# Patient Record
Sex: MALE | NOT HISPANIC OR LATINO | Employment: OTHER | URBAN - METROPOLITAN AREA
[De-identification: names, ages, dates, MRNs, and addresses within clinical notes are randomized per-mention and may not be internally consistent; named-entity substitution may affect disease eponyms.]

---

## 2017-08-24 ENCOUNTER — TRANSCRIBE ORDERS (OUTPATIENT)
Dept: ADMINISTRATIVE | Facility: HOSPITAL | Age: 52
End: 2017-08-24

## 2017-08-24 DIAGNOSIS — R11.0 NAUSEA ALONE: ICD-10-CM

## 2017-08-24 DIAGNOSIS — R10.11 RUQ ABDOMINAL PAIN: Primary | ICD-10-CM

## 2017-08-29 ENCOUNTER — HOSPITAL ENCOUNTER (OUTPATIENT)
Dept: ULTRASOUND IMAGING | Facility: HOSPITAL | Age: 52
Discharge: HOME | End: 2017-08-29
Payer: COMMERCIAL

## 2017-08-29 DIAGNOSIS — R10.11 RUQ ABDOMINAL PAIN: ICD-10-CM

## 2017-08-29 PROCEDURE — 76700 US EXAM ABDOM COMPLETE: CPT

## 2017-09-13 ENCOUNTER — ALLSCRIPTS OFFICE VISIT (OUTPATIENT)
Dept: OTHER | Facility: OTHER | Age: 52
End: 2017-09-13

## 2017-09-13 ENCOUNTER — TRANSCRIBE ORDERS (OUTPATIENT)
Dept: ADMINISTRATIVE | Facility: HOSPITAL | Age: 52
End: 2017-09-13

## 2017-09-13 DIAGNOSIS — R41.89 AKINETIC MUTISM: Primary | ICD-10-CM

## 2017-09-13 DIAGNOSIS — R41.89 OTHER SYMPTOMS AND SIGNS INVOLVING COGNITIVE FUNCTIONS AND AWARENESS: ICD-10-CM

## 2017-09-15 ENCOUNTER — LAB CONVERSION - ENCOUNTER (OUTPATIENT)
Dept: OTHER | Facility: OTHER | Age: 52
End: 2017-09-15

## 2017-09-15 LAB
25(OH)D3 SERPL-MCNC: 31 NG/ML (ref 30–100)
TSH SERPL DL<=0.05 MIU/L-ACNC: 0.93 MIU/L (ref 0.4–4.5)

## 2017-09-18 ENCOUNTER — LAB CONVERSION - ENCOUNTER (OUTPATIENT)
Dept: OTHER | Facility: OTHER | Age: 52
End: 2017-09-18

## 2017-09-18 ENCOUNTER — HOSPITAL ENCOUNTER (OUTPATIENT)
Dept: NUCLEAR MEDICINE | Facility: HOSPITAL | Age: 52
Discharge: HOME | End: 2017-09-18
Payer: COMMERCIAL

## 2017-09-18 DIAGNOSIS — R11.0 NAUSEA ALONE: ICD-10-CM

## 2017-09-18 LAB
25(OH)D3 SERPL-MCNC: 31 NG/ML (ref 30–100)
ANTI-NUCLEAR ANTIBODY (ANA) (HISTORICAL): NEGATIVE
TSH SERPL DL<=0.05 MIU/L-ACNC: 0.93 MIU/L (ref 0.4–4.5)

## 2017-09-18 PROCEDURE — A9541 TC99M SULFUR COLLOID: HCPCS

## 2017-09-18 PROCEDURE — 78264 GASTRIC EMPTYING IMG STUDY: CPT

## 2017-09-20 ENCOUNTER — LAB CONVERSION - ENCOUNTER (OUTPATIENT)
Dept: OTHER | Facility: OTHER | Age: 52
End: 2017-09-20

## 2017-09-20 LAB
25(OH)D3 SERPL-MCNC: 31 NG/ML (ref 30–100)
ANTI-NUCLEAR ANTIBODY (ANA) (HISTORICAL): NEGATIVE
CONTACT: (HISTORICAL): NORMAL
TEST INFORMATION (HISTORICAL): NORMAL
TEST NAME (HISTORICAL): NORMAL
TSH SERPL DL<=0.05 MIU/L-ACNC: 0.93 MIU/L (ref 0.4–4.5)

## 2017-09-21 ENCOUNTER — LAB CONVERSION - ENCOUNTER (OUTPATIENT)
Dept: OTHER | Facility: OTHER | Age: 52
End: 2017-09-21

## 2017-09-21 LAB
25(OH)D3 SERPL-MCNC: 31 NG/ML (ref 30–100)
ANTI-NUCLEAR ANTIBODY (ANA) (HISTORICAL): NEGATIVE
BUN SERPL-MCNC: 14 MG/DL (ref 7–25)
BUN/CREA RATIO (HISTORICAL): NORMAL (CALC) (ref 6–22)
CONTACT: (HISTORICAL): NORMAL
CREAT SERPL-MCNC: 0.98 MG/DL (ref 0.7–1.33)
EGFR AFRICAN AMERICAN (HISTORICAL): 102 ML/MIN/1.73M2
EGFR-AMERICAN CALC (HISTORICAL): 88 ML/MIN/1.73M2
FOLATE SERPL-MCNC: 13.1 NG/ML
TEST INFORMATION (HISTORICAL): NORMAL
TEST NAME (HISTORICAL): NORMAL
TSH SERPL DL<=0.05 MIU/L-ACNC: 0.93 MIU/L (ref 0.4–4.5)
VIT B12 SERPL-MCNC: 380 PG/ML (ref 200–1100)

## 2017-09-28 ENCOUNTER — HOSPITAL ENCOUNTER (OUTPATIENT)
Dept: NEUROLOGY | Facility: HOSPITAL | Age: 52
Discharge: HOME | End: 2017-09-28
Payer: COMMERCIAL

## 2017-09-28 ENCOUNTER — HOSPITAL ENCOUNTER (OUTPATIENT)
Dept: RADIOLOGY | Facility: HOSPITAL | Age: 52
Discharge: HOME | End: 2017-09-28
Payer: COMMERCIAL

## 2017-09-28 DIAGNOSIS — R41.89 AKINETIC MUTISM: ICD-10-CM

## 2017-09-28 PROCEDURE — 70553 MRI BRAIN STEM W/O & W/DYE: CPT

## 2017-09-28 PROCEDURE — A9585 GADOBUTROL INJECTION: HCPCS | Performed by: RADIOLOGY

## 2017-09-28 PROCEDURE — 95816 EEG AWAKE AND DROWSY: CPT

## 2017-09-28 RX ADMIN — GADOBUTROL 9 ML: 604.72 INJECTION INTRAVENOUS at 14:19

## 2018-01-13 VITALS
HEART RATE: 60 BPM | BODY MASS INDEX: 29.68 KG/M2 | RESPIRATION RATE: 12 BRPM | SYSTOLIC BLOOD PRESSURE: 130 MMHG | WEIGHT: 212 LBS | OXYGEN SATURATION: 98 % | HEIGHT: 71 IN | DIASTOLIC BLOOD PRESSURE: 80 MMHG

## 2022-09-13 PROBLEM — E11.9 DMII (DIABETES MELLITUS, TYPE 2) (HCC): Status: ACTIVE | Noted: 2021-02-06

## 2022-09-13 PROBLEM — E78.00 HYPERCHOLESTEREMIA: Status: ACTIVE | Noted: 2021-02-06

## 2022-09-13 PROBLEM — F07.81 POST CONCUSSIVE ENCEPHALOPATHY: Status: ACTIVE | Noted: 2017-09-29

## 2022-09-13 PROBLEM — G43.909 MIGRAINE: Status: ACTIVE | Noted: 2017-09-13

## 2022-09-13 PROBLEM — G90.50 RSD (REFLEX SYMPATHETIC DYSTROPHY): Status: ACTIVE | Noted: 2021-02-06

## 2022-09-13 PROBLEM — I10 HYPERTENSION: Status: ACTIVE | Noted: 2021-02-06

## 2023-01-03 ENCOUNTER — OFFICE VISIT (OUTPATIENT)
Dept: URGENT CARE | Facility: CLINIC | Age: 58
End: 2023-01-03

## 2023-01-03 VITALS — HEART RATE: 70 BPM | OXYGEN SATURATION: 96 % | RESPIRATION RATE: 18 BRPM | TEMPERATURE: 102.7 F

## 2023-01-03 DIAGNOSIS — R50.9 FEVER, UNSPECIFIED FEVER CAUSE: Primary | ICD-10-CM

## 2023-01-03 RX ORDER — ACETAMINOPHEN 325 MG/1
650 TABLET ORAL ONCE
Status: COMPLETED | OUTPATIENT
Start: 2023-01-03 | End: 2023-01-03

## 2023-01-03 RX ORDER — ACETAMINOPHEN 325 MG/1
650 TABLET ORAL EVERY 6 HOURS PRN
Status: DISCONTINUED | OUTPATIENT
Start: 2023-01-03 | End: 2023-01-03

## 2023-01-03 RX ORDER — OSELTAMIVIR PHOSPHATE 75 MG/1
75 CAPSULE ORAL EVERY 12 HOURS SCHEDULED
Qty: 10 CAPSULE | Refills: 0 | Status: SHIPPED | OUTPATIENT
Start: 2023-01-03 | End: 2023-01-08

## 2023-01-03 RX ADMIN — ACETAMINOPHEN 650 MG: 325 TABLET ORAL at 19:32

## 2023-01-04 LAB
FLUAV RNA RESP QL NAA+PROBE: NEGATIVE
FLUBV RNA RESP QL NAA+PROBE: NEGATIVE
SARS-COV-2 RNA RESP QL NAA+PROBE: POSITIVE

## 2023-01-04 NOTE — PATIENT INSTRUCTIONS
Take medication as directed  Rest and drink plenty of fluids  A cool mist humidifier can be helpful  If you develop a prolonged high fever, worsening cough, chest pain, shortness of breath, palpitations, severe headache, dizziness, you  pass out, any new or concerning symptoms please return or proceed ER    Recommend follow-up with PCP in 3-5 days

## 2023-01-05 NOTE — PROGRESS NOTES
[facility]        NAME: Arelis Bassett is a 62 y o  male  : 1965    MRN: [id number]  DATE: 2023  TIME: 3:02 PM    Assessment and Plan   Fever, unspecified fever cause [R50 9]  1  Fever, unspecified fever cause  Covid/Flu-Office Collect    acetaminophen (TYLENOL) tablet 650 mg    oseltamivir (TAMIFLU) 75 mg capsule    DISCONTINUED: acetaminophen (TYLENOL) tablet 650 mg            Patient Instructions     Patient Instructions    Take medication as directed  Rest and drink plenty of fluids  A cool mist humidifier can be helpful  If you develop a prolonged high fever, worsening cough, chest pain, shortness of breath, palpitations, severe headache, dizziness, you  pass out, any new or concerning symptoms please return or proceed ER  Recommend follow-up with PCP in 3-5 days        Chief Complaint     Chief Complaint   Patient presents with   • Generalized Body Aches     Fever, aches, fatigue x 2 days            History of Present Illness       Fever  This is a new problem  The current episode started yesterday  The problem occurs constantly  The problem has been unchanged  Associated symptoms include chills, congestion, fatigue, a fever and myalgias  Pertinent negatives include no abdominal pain, arthralgias, chest pain, coughing, diaphoresis, headaches, joint swelling, nausea, neck pain, numbness, rash, sore throat, swollen glands, urinary symptoms, vomiting or weakness  Nothing aggravates the symptoms  He has tried acetaminophen for the symptoms  The treatment provided mild relief  Review of Systems   Review of Systems   Constitutional: Positive for chills, fatigue and fever  Negative for diaphoresis  HENT: Positive for congestion and rhinorrhea  Negative for ear discharge, ear pain, facial swelling, mouth sores, postnasal drip, sinus pressure, sinus pain, sore throat and trouble swallowing  Eyes: Negative for photophobia and visual disturbance     Respiratory: Negative for cough, chest tightness, shortness of breath, wheezing and stridor  Cardiovascular: Negative for chest pain and palpitations  Gastrointestinal: Negative for abdominal pain, diarrhea, nausea and vomiting  Genitourinary: Negative  Musculoskeletal: Positive for myalgias  Negative for arthralgias, back pain, joint swelling, neck pain and neck stiffness  Skin: Negative for rash  Neurological: Negative for dizziness, facial asymmetry, weakness, light-headedness, numbness and headaches           Current Medications       Current Outpatient Medications:   •  oseltamivir (TAMIFLU) 75 mg capsule, Take 1 capsule (75 mg total) by mouth every 12 (twelve) hours for 5 days, Disp: 10 capsule, Rfl: 0  •  ASHWAGANDHA PO, Take by mouth, Disp: , Rfl:   •  BIOTIN PO, Take by mouth, Disp: , Rfl:   •  linaCLOtide (Linzess) 290 MCG CAPS, Take 1 capsule by mouth in the morning 30 min before breakfast, Disp: 30 capsule, Rfl: 5  •  lisinopril (ZESTRIL) 10 mg tablet, Take 10 mg by mouth daily, Disp: , Rfl:   •  MILK THISTLE PO, Take by mouth, Disp: , Rfl:   •  MULTIPLE VITAMIN PO, Take by mouth, Disp: , Rfl:   •  Niacin (VITAMIN B-3 PO), Take by mouth, Disp: , Rfl:   •  RESVERATROL PO, Take by mouth, Disp: , Rfl:   •  Sod Picosulfate-Mag Ox-Cit Acd (Clenpiq) 10-3 5-12 MG-GM -GM/160ML SOLN, Take 1 kit by mouth see administration instructions, Disp: 160 mL, Rfl: 0  •  tadalafil (CIALIS) 5 MG tablet, Take 5 mg by mouth in the morning, Disp: , Rfl:   •  TURMERIC PO, Take by mouth, Disp: , Rfl:     Current Allergies     Allergies as of 01/03/2023   • (No Known Allergies)            The following portions of the patient's history were reviewed and updated as appropriate: allergies, current medications, past family history, past medical history, past social history, past surgical history and problem list      Past Medical History:   Diagnosis Date   • [facility] 02/06/2021   • Hypercholesteremia 02/06/2021   • Hypertension 02/06/2021   • IBS (irritable bowel syndrome)     Constipation predominant   • Lyme disease    • Migraine 09/13/2017   • Osteoarthritis    • Post concussive encephalopathy 09/29/2017   • RSD (reflex sympathetic dystrophy) 02/06/2021       Past Surgical History:   Procedure Laterality Date   • CHOLECYSTECTOMY         Family History   Problem Relation Age of Onset   • Skin cancer Mother    • Cervical cancer Daughter          Medications have been verified  Objective   Pulse 70   Temp (!) 102 7 °F (39 3 °C)   Resp 18   SpO2 96%   No LMP for male patient  Physical Exam     Physical Exam  Constitutional:       General: He is not in acute distress  Appearance: He is well-developed  HENT:      Head: Normocephalic and atraumatic  Right Ear: Hearing, tympanic membrane, ear canal and external ear normal       Left Ear: Hearing, tympanic membrane, ear canal and external ear normal       Nose: Congestion and rhinorrhea present  No mucosal edema  Right Sinus: No maxillary sinus tenderness or frontal sinus tenderness  Left Sinus: No maxillary sinus tenderness or frontal sinus tenderness  Mouth/Throat:      Mouth: Mucous membranes are moist       Pharynx: Oropharynx is clear  Uvula midline  No oropharyngeal exudate or posterior oropharyngeal erythema  Tonsils: No tonsillar exudate or tonsillar abscesses  1+ on the right  1+ on the left  Cardiovascular:      Rate and Rhythm: Normal rate and regular rhythm  Heart sounds: Normal heart sounds, S1 normal and S2 normal    Pulmonary:      Effort: Pulmonary effort is normal       Breath sounds: Normal breath sounds and air entry  Lymphadenopathy:      Cervical: No cervical adenopathy  Skin:     General: Skin is warm and dry  Capillary Refill: Capillary refill takes less than 2 seconds  Neurological:      Mental Status: He is alert and oriented to person, place, and time

## 2023-01-06 ENCOUNTER — TELEPHONE (OUTPATIENT)
Dept: URGENT CARE | Facility: CLINIC | Age: 58
End: 2023-01-06

## 2023-01-06 NOTE — TELEPHONE ENCOUNTER
Patient notified of positive COVID-19 test results  Advised to start vitamin-C, D, and multivitamin  Advised to monitor pulse ox, if you drop below 94% advised to return or  to proceed ER  Or you develop any new or worsening symptoms  Advised follow-up with PCP in 24 hours  Instructed to self isolate  Discussed quarantine guidelines  Patient verbalizes understanding  Advised to discontinue tamiflu

## 2023-02-13 ENCOUNTER — HOSPITAL ENCOUNTER (OUTPATIENT)
Dept: CT IMAGING | Facility: HOSPITAL | Age: 58
Discharge: HOME | End: 2023-02-13

## 2023-02-13 DIAGNOSIS — Z87.891 PERSONAL HISTORY OF TOBACCO USE, PRESENTING HAZARDS TO HEALTH: ICD-10-CM

## 2023-06-19 ENCOUNTER — HOSPITAL ENCOUNTER (OUTPATIENT)
Dept: CT IMAGING | Facility: HOSPITAL | Age: 58
Discharge: HOME | End: 2023-06-19
Payer: COMMERCIAL

## 2023-06-19 DIAGNOSIS — R91.8 ABNORMAL FINDINGS ON DIAGNOSTIC IMAGING OF LUNG: ICD-10-CM

## 2023-06-19 PROCEDURE — G1004 CDSM NDSC: HCPCS

## 2023-06-19 PROCEDURE — 71250 CT THORAX DX C-: CPT

## 2023-12-01 ENCOUNTER — OFFICE VISIT (OUTPATIENT)
Dept: URGENT CARE | Facility: CLINIC | Age: 58
End: 2023-12-01
Payer: COMMERCIAL

## 2023-12-01 VITALS
HEIGHT: 70 IN | WEIGHT: 198 LBS | TEMPERATURE: 98.5 F | BODY MASS INDEX: 28.35 KG/M2 | SYSTOLIC BLOOD PRESSURE: 162 MMHG | DIASTOLIC BLOOD PRESSURE: 80 MMHG | OXYGEN SATURATION: 99 % | RESPIRATION RATE: 18 BRPM | HEART RATE: 56 BPM

## 2023-12-01 DIAGNOSIS — M79.651 PAIN OF RIGHT THIGH: ICD-10-CM

## 2023-12-01 DIAGNOSIS — R07.9 CHEST PAIN, UNSPECIFIED TYPE: Primary | ICD-10-CM

## 2023-12-01 PROCEDURE — 93005 ELECTROCARDIOGRAM TRACING: CPT | Performed by: NURSE PRACTITIONER

## 2023-12-01 PROCEDURE — S9083 URGENT CARE CENTER GLOBAL: HCPCS | Performed by: NURSE PRACTITIONER

## 2023-12-01 PROCEDURE — 99213 OFFICE O/P EST LOW 20 MIN: CPT | Performed by: NURSE PRACTITIONER

## 2023-12-01 RX ORDER — CYCLOBENZAPRINE HCL 10 MG
TABLET ORAL
COMMUNITY
Start: 2023-11-27

## 2023-12-01 NOTE — PROGRESS NOTES
[facility]        NAME: Sherry Godoy is a 62 y.o. male  : 1965    MRN: [id number]  DATE: 2023  TIME: 3:15 PM    Assessment and Plan   Chest pain, unspecified type [R07.9]  1. Chest pain, unspecified type        2. Pain of right thigh              Patient Instructions     Patient Instructions   Please follow up with PCP as scheduled. Recommend applying heat or ice. Tylenol as needed. If you develop any increased pain, redness, swelling, warmth, chest pain, sob, palpitations or any new or concerning symptoms please return or proceed to ER. Follow up with pcp in 3-5 days      Chief Complaint     Chief Complaint   Patient presents with    Leg Pain     Started two months ago. Last night it got worse. Was not able to sleep. There is a cyst where it hurts which he has had for a couple of years. Chest Pain     Started in February after he got Covid. Has pain every day on any type of exertion. Pain in middle of chest and right right side of chest.         History of Present Illness       Leg Pain   Incident onset: 2 weeks ago. The incident occurred at home. There was no injury mechanism. The pain is present in the right thigh. The quality of the pain is described as aching. The pain is moderate. The pain has been Constant since onset. Pertinent negatives include no inability to bear weight, loss of motion, loss of sensation, muscle weakness, numbness or tingling. Associated symptoms comments: "Cyst on my thigh"  . He reports no foreign bodies present. The symptoms are aggravated by palpation. He has tried nothing for the symptoms. The treatment provided no relief. Chest Pain   This is a recurrent problem. Episode onset: 10 months ago. The onset quality is undetermined. The problem occurs intermittently. The problem has been unchanged. The pain is present in the lateral region (right lateral). The quality of the pain is described as dull. The pain does not radiate.  Associated symptoms include leg pain. Pertinent negatives include no abdominal pain, back pain, cough, diaphoresis, dizziness, exertional chest pressure, fever, headaches, hemoptysis, nausea, numbness, orthopnea, palpitations, shortness of breath, sputum production, syncope, vomiting or weakness. The pain is aggravated by nothing. He has tried nothing for the symptoms. Past medical history comments: began after having covid in feb 2023       Review of Systems   Review of Systems   Constitutional:  Negative for chills, diaphoresis, fatigue and fever. HENT: Negative. Respiratory:  Negative for cough, hemoptysis, sputum production, chest tightness, shortness of breath, wheezing and stridor. Cardiovascular:  Positive for chest pain. Negative for palpitations, orthopnea and syncope. Gastrointestinal:  Negative for abdominal pain, constipation, diarrhea, nausea, rectal pain and vomiting. Genitourinary:  Negative for decreased urine volume, difficulty urinating, dysuria, flank pain, frequency, hematuria and urgency. Musculoskeletal:  Positive for myalgias. Negative for arthralgias, back pain, joint swelling, neck pain and neck stiffness. Skin:  Negative for rash. Neurological:  Negative for dizziness, tingling, syncope, weakness, light-headedness, numbness and headaches.          Current Medications       Current Outpatient Medications:     ASHWAGANDHA PO, Take by mouth, Disp: , Rfl:     BIOTIN PO, Take by mouth, Disp: , Rfl:     cyclobenzaprine (FLEXERIL) 10 mg tablet, , Disp: , Rfl:     lisinopril (ZESTRIL) 10 mg tablet, Take 10 mg by mouth daily, Disp: , Rfl:     MILK THISTLE PO, Take by mouth, Disp: , Rfl:     MULTIPLE VITAMIN PO, Take by mouth, Disp: , Rfl:     Niacin (VITAMIN B-3 PO), Take by mouth, Disp: , Rfl:     RESVERATROL PO, Take by mouth, Disp: , Rfl:     tadalafil (CIALIS) 5 MG tablet, Take 5 mg by mouth in the morning, Disp: , Rfl:     TURMERIC PO, Take by mouth, Disp: , Rfl:     linaCLOtide (Linzess) 290 MCG CAPS, Take 1 capsule by mouth in the morning 30 min before breakfast (Patient not taking: Reported on 12/1/2023), Disp: 30 capsule, Rfl: 5    Sod Picosulfate-Mag Ox-Cit Acd (Clenpiq) 10-3.5-12 MG-GM -GM/160ML SOLN, Take 1 kit by mouth see administration instructions (Patient not taking: Reported on 12/1/2023), Disp: 160 mL, Rfl: 0    Current Allergies     Allergies as of 12/01/2023    (No Known Allergies)            The following portions of the patient's history were reviewed and updated as appropriate: allergies, current medications, past family history, past medical history, past social history, past surgical history and problem list.     Past Medical History:   Diagnosis Date    DMII (diabetes mellitus, type 2) ([facility]) 02/06/2021    Hypercholesteremia 02/06/2021    Hypertension 02/06/2021    IBS (irritable bowel syndrome)     Constipation predominant    Lyme disease     Migraine 09/13/2017    Osteoarthritis     Post concussive encephalopathy 09/29/2017    RSD (reflex sympathetic dystrophy) 02/06/2021       Past Surgical History:   Procedure Laterality Date    CHOLECYSTECTOMY         Family History   Problem Relation Age of Onset    Skin cancer Mother     Cervical cancer Daughter          Medications have been verified. Objective   /80   Pulse 56   Temp 98.5 °F (36.9 °C)   Resp 18   Ht 5' 10" (1.778 m)   Wt 89.8 kg (198 lb)   SpO2 99%   BMI 28.41 kg/m²   No LMP for male patient. Physical Exam     Physical Exam  Constitutional:       General: He is not in acute distress. Appearance: He is well-developed. He is not diaphoretic. Cardiovascular:      Rate and Rhythm: Normal rate and regular rhythm. Pulses: Normal pulses. Heart sounds: Normal heart sounds, S1 normal and S2 normal.   Pulmonary:      Effort: Pulmonary effort is normal.      Breath sounds: Normal breath sounds and air entry. Abdominal:      General: Bowel sounds are normal. There is no distension. Palpations: Abdomen is soft. Abdomen is not rigid. There is no shifting dullness or mass. Tenderness: There is no abdominal tenderness. There is no guarding or rebound. Negative signs include Zuniga's sign and McBurney's sign. Musculoskeletal:      Right upper leg: No swelling, edema, deformity, lacerations, tenderness or bony tenderness. Comments: No cyst or mass palpated to right thigh. No erythema, warmth or swelling noted     Skin:     General: Skin is warm and dry. Capillary Refill: Capillary refill takes less than 2 seconds. Neurological:      Mental Status: He is alert and oriented to person, place, and time. GCS: GCS eye subscore is 4. GCS verbal subscore is 5. GCS motor subscore is 6.

## 2023-12-01 NOTE — PATIENT INSTRUCTIONS
Please follow up with PCP as scheduled. Recommend applying heat or ice. Tylenol as needed. If you develop any increased pain, redness, swelling, warmth, chest pain, sob, palpitations or any new or concerning symptoms please return or proceed to ER.  Follow up with pcp in 3-5 days

## 2023-12-02 LAB
ATRIAL RATE: 55 BPM
P AXIS: -57 DEGREES
PR INTERVAL: 172 MS
QRS AXIS: -43 DEGREES
QRSD INTERVAL: 138 MS
QT INTERVAL: 490 MS
QTC INTERVAL: 468 MS
T WAVE AXIS: -18 DEGREES
VENTRICULAR RATE: 55 BPM

## 2024-05-19 ENCOUNTER — HOSPITAL ENCOUNTER (EMERGENCY)
Facility: HOSPITAL | Age: 59
Discharge: HOME | End: 2024-05-19
Attending: EMERGENCY MEDICINE
Payer: COMMERCIAL

## 2024-05-19 VITALS
HEART RATE: 69 BPM | RESPIRATION RATE: 16 BRPM | OXYGEN SATURATION: 97 % | DIASTOLIC BLOOD PRESSURE: 86 MMHG | TEMPERATURE: 98.5 F | SYSTOLIC BLOOD PRESSURE: 182 MMHG

## 2024-05-19 DIAGNOSIS — S81.819A: Primary | ICD-10-CM

## 2024-05-19 PROCEDURE — 12004 RPR S/N/AX/GEN/TRK7.6-12.5CM: CPT | Performed by: EMERGENCY MEDICINE

## 2024-05-19 PROCEDURE — 99282 EMERGENCY DEPT VISIT SF MDM: CPT

## 2024-05-19 PROCEDURE — 99284 EMERGENCY DEPT VISIT MOD MDM: CPT | Performed by: EMERGENCY MEDICINE

## 2024-05-19 RX ORDER — BACITRACIN, NEOMYCIN, POLYMYXIN B 400; 3.5; 5 [USP'U]/G; MG/G; [USP'U]/G
1 OINTMENT TOPICAL ONCE
Status: COMPLETED | OUTPATIENT
Start: 2024-05-19 | End: 2024-05-19

## 2024-05-19 RX ORDER — LIDOCAINE HYDROCHLORIDE AND EPINEPHRINE 10; 10 MG/ML; UG/ML
20 INJECTION, SOLUTION INFILTRATION; PERINEURAL ONCE
Status: COMPLETED | OUTPATIENT
Start: 2024-05-19 | End: 2024-05-19

## 2024-05-19 RX ADMIN — LIDOCAINE HYDROCHLORIDE,EPINEPHRINE BITARTRATE 20 ML: 10; .01 INJECTION, SOLUTION INFILTRATION; PERINEURAL at 18:13

## 2024-05-19 RX ADMIN — BACITRACIN ZINC, NEOMYCIN, POLYMYXIN B SULFAT 1 SMALL APPLICATION: 5000; 3.5; 4 OINTMENT TOPICAL at 18:38

## 2024-05-19 NOTE — ED PROVIDER NOTES
History  Chief Complaint   Patient presents with    Laceration     Patient reports right leg laceration after an open tuna can hit him on the leg.      Right laceration to the calf, bleeding controlled.  No anticoagulation weakness numbness.  Had Tdap about a week ago.      Laceration      Prior to Admission Medications   Prescriptions Last Dose Informant Patient Reported? Taking?   ASHWAGANDHA PO   Yes No   Sig: Take by mouth   BIOTIN PO   Yes No   Sig: Take by mouth   MILK THISTLE PO   Yes No   Sig: Take by mouth   MULTIPLE VITAMIN PO   Yes No   Sig: Take by mouth   Niacin (VITAMIN B-3 PO)   Yes No   Sig: Take by mouth   RESVERATROL PO   Yes No   Sig: Take by mouth   Sod Picosulfate-Mag Ox-Cit Acd (Clenpiq) 10-3.5-12 MG-GM -GM/160ML SOLN   No No   Sig: Take 1 kit by mouth see administration instructions   Patient not taking: Reported on 12/1/2023   TURMERIC PO   Yes No   Sig: Take by mouth   cyclobenzaprine (FLEXERIL) 10 mg tablet   Yes No   linaCLOtide (Linzess) 290 MCG CAPS   No No   Sig: Take 1 capsule by mouth in the morning 30 min before breakfast   Patient not taking: Reported on 12/1/2023   lisinopril (ZESTRIL) 10 mg tablet   Yes No   Sig: Take 10 mg by mouth daily   tadalafil (CIALIS) 5 MG tablet   Yes No   Sig: Take 5 mg by mouth in the morning      Facility-Administered Medications: None       Past Medical History:   Diagnosis Date    DMII (diabetes mellitus, type 2) ([facility]) 02/06/2021    Hypercholesteremia 02/06/2021    Hypertension 02/06/2021    IBS (irritable bowel syndrome)     Constipation predominant    Lyme disease     Migraine 09/13/2017    Osteoarthritis     Post concussive encephalopathy 09/29/2017    RSD (reflex sympathetic dystrophy) 02/06/2021       Past Surgical History:   Procedure Laterality Date    CHOLECYSTECTOMY         Family History   Problem Relation Age of Onset    Skin cancer Mother     Cervical cancer Daughter      I have reviewed and agree with the history as  documented.    E-Cigarette/Vaping    E-Cigarette Use Never User      E-Cigarette/Vaping Substances     Social History     Tobacco Use    Smoking status: Every Day     Current packs/day: 1.00     Average packs/day: 1 pack/day for 25.0 years (25.0 ttl pk-yrs)     Types: Cigarettes    Smokeless tobacco: Never   Vaping Use    Vaping status: Never Used   Substance Use Topics    Alcohol use: Yes     Alcohol/week: 7.0 standard drinks of alcohol     Types: 7 Standard drinks or equivalent per week     Comment: daily    Drug use: Never       Review of Systems    Physical Exam  Physical Exam  Vitals and nursing note reviewed.   Constitutional:       General: He is not in acute distress.     Appearance: Normal appearance.   HENT:      Head: Normocephalic and atraumatic.      Nose: Nose normal.   Eyes:      General:         Right eye: No discharge.         Left eye: No discharge.   Cardiovascular:      Rate and Rhythm: Normal rate and regular rhythm.   Pulmonary:      Effort: Pulmonary effort is normal. No respiratory distress.   Abdominal:      General: Abdomen is flat. There is no distension.   Musculoskeletal:         General: No deformity. Normal range of motion.      Comments: 11 cm laceration to the posterior right calf, mild venous bleeding   Skin:     General: Skin is warm.      Findings: No rash.   Neurological:      Mental Status: He is alert and oriented to person, place, and time.      Motor: No weakness.   Psychiatric:         Mood and Affect: Mood normal.         Behavior: Behavior normal.         Vital Signs  ED Triage Vitals   Temperature Pulse Respirations Blood Pressure SpO2   05/19/24 1752 05/19/24 1752 05/19/24 1752 05/19/24 1753 05/19/24 1752   98.5 °F (36.9 °C) 66 18 (!) 211/107 98 %      Temp src Heart Rate Source Patient Position - Orthostatic VS BP Location FiO2 (%)   -- -- -- -- --             Pain Score       05/19/24 1752       7           Vitals:    05/19/24 1752 05/19/24 1753 05/19/24 1815   BP:   "(!) 211/107 (!) 182/86   Pulse: 66  69         Visual Acuity      ED Medications  Medications   lidocaine-epinephrine (XYLOCAINE/EPINEPHRINE) 1 %-1:100,000 injection 20 mL (20 mL Infiltration Given 5/19/24 1813)   neomycin-bacitracin-polymyxin b (NEOSPORIN) ointment 1 small application (1 small application Topical Given 5/19/24 1838)       Diagnostic Studies  Results Reviewed       None                   No orders to display              Procedures  Universal Protocol:  Risks and benefits: risks, benefits and alternatives were discussed  Consent given by: patient  Time out: Immediately prior to procedure a \"time out\" was called to verify the correct patient, procedure, equipment, support staff and site/side marked as required.  Patient identity confirmed: verbally with patient  Laceration repair    Date/Time: 5/19/2024 6:31 PM    Performed by: Khadar Poole DO  Authorized by: Khadar Poole DO  Body area: lower extremity  Location details: right lower leg  Laceration length: 11 cm      Procedure Details:  Wound skin closure material used: 2-0.  Number of sutures: 8  Technique: simple  Approximation: close  Approximation difficulty: simple  Dressing: antibiotic ointment and 4x4 sterile gauze  Patient tolerance: patient tolerated the procedure well with no immediate complications               ED Course                               SBIRT 20yo+      Flowsheet Row Most Recent Value   Initial Alcohol Screen: US AUDIT-C     1. How often do you have a drink containing alcohol? 0 Filed at: 05/19/2024 1753   2. How many drinks containing alcohol do you have on a typical day you are drinking?  0 Filed at: 05/19/2024 1753   3a. Male UNDER 65: How often do you have five or more drinks on one occasion? 0 Filed at: 05/19/2024 1753   3b. FEMALE Any Age, or MALE 65+: How often do you have 4 or more drinks on one occassion? 0 Filed at: 05/19/2024 1753   Audit-C Score 0 Filed at: 05/19/2024 1753   [place]: How many times " in the past year have you...    Used an illegal drug or used a prescription medication for non-medical reasons? Never Filed at: 05/19/2024 1752                      Medical Decision Making  Isolated laceration to the right calf, neurovascular intact, extends through the subcutaneous layers.  Does not violate the musculature.  Wound examined in a bloodless field, no foreign bodies appreciated.  Irrigated copiously, his tetanus is up-to-date.  Repaired with routine precautions.    Problems Addressed:  Laceration of calf: acute illness or injury    Risk  OTC drugs.  Prescription drug management.             Disposition  Final diagnoses:   Laceration of calf     Time reflects when diagnosis was documented in both MDM as applicable and the Disposition within this note       Time User Action Codes Description Comment    5/19/2024  6:28 PM Khadar Poole Add [S81.819A] Laceration of calf           ED Disposition       ED Disposition   Discharge    Condition   Stable    Date/Time   Sun May 19, 2024 1828    Comment   Oscar Hardin discharge to home/self care.                   Follow-up Information       Follow up With Specialties Details Why Contact Info    Douglas Charles Shoenberger, MD Family Medicine  As needed [address] [place] [id number]  [phone]              Discharge Medication List as of 5/19/2024  6:28 PM        CONTINUE these medications which have NOT CHANGED    Details   ASHWAGANDHA PO Take by mouth, Historical Med      BIOTIN PO Take by mouth, Historical Med      cyclobenzaprine (FLEXERIL) 10 mg tablet Historical Med      linaCLOtide (Linzess) 290 MCG CAPS Take 1 capsule by mouth in the morning 30 min before breakfast, Starting Tue 9/13/2022, Normal      lisinopril (ZESTRIL) 10 mg tablet Take 10 mg by mouth daily, Historical Med      MILK THISTLE PO Take by mouth, Historical Med      MULTIPLE VITAMIN PO Take by mouth, Historical Med      Niacin (VITAMIN B-3 PO) Take by  mouth, Historical Med      RESVERATROL PO Take by mouth, Historical Med      Sod Picosulfate-Mag Ox-Cit Acd (Clenpiq) 10-3.5-12 MG-GM -GM/160ML SOLN Take 1 kit by mouth see administration instructions, Starting Tue 9/13/2022, Normal      tadalafil (CIALIS) 5 MG tablet Take 5 mg by mouth in the morning, Historical Med      TURMERIC PO Take by mouth, Historical Med             No discharge procedures on file.    PDMP Review       None            ED Provider  Electronically Signed by             Khadar Poole DO  05/19/24 2031

## 2024-05-19 NOTE — DISCHARGE INSTRUCTIONS
Your sutures should be removed in 7 days  Keep bandage on until tomorrow  Clean gently with soap and water daily  Apply neosporin 2-4 times daily

## 2024-10-14 ENCOUNTER — APPOINTMENT (EMERGENCY)
Dept: RADIOLOGY | Facility: HOSPITAL | Age: 59
End: 2024-10-14
Payer: COMMERCIAL

## 2024-10-14 ENCOUNTER — HOSPITAL ENCOUNTER (OUTPATIENT)
Facility: HOSPITAL | Age: 59
Setting detail: OBSERVATION
Discharge: HOME | End: 2024-10-15
Admitting: HOSPITALIST
Payer: COMMERCIAL

## 2024-10-14 DIAGNOSIS — I48.91 NEW ONSET A-FIB (HCC): ICD-10-CM

## 2024-10-14 DIAGNOSIS — I48.91 ATRIAL FIBRILLATION WITH RAPID VENTRICULAR RESPONSE (HCC): Primary | ICD-10-CM

## 2024-10-14 PROBLEM — Z72.0 TOBACCO USE: Status: ACTIVE | Noted: 2024-10-14

## 2024-10-14 PROBLEM — Z78.9 ALCOHOL USE: Status: ACTIVE | Noted: 2024-10-14

## 2024-10-14 PROBLEM — F10.90 ALCOHOL USE: Status: ACTIVE | Noted: 2024-10-14

## 2024-10-14 LAB
2HR DELTA HS TROPONIN: -1 NG/L
4HR DELTA HS TROPONIN: 0 NG/L
ALBUMIN SERPL BCG-MCNC: 4.3 G/DL (ref 3.5–5)
ALP SERPL-CCNC: 64 U/L (ref 34–104)
ALT SERPL W P-5'-P-CCNC: 27 U/L (ref 7–52)
ANION GAP SERPL CALCULATED.3IONS-SCNC: 8 MMOL/L (ref 4–13)
AST SERPL W P-5'-P-CCNC: 14 U/L (ref 13–39)
BASOPHILS # BLD AUTO: 0.03 THOUSANDS/ΜL (ref 0–0.1)
BASOPHILS NFR BLD AUTO: 0 % (ref 0–1)
BILIRUB SERPL-MCNC: 0.65 MG/DL (ref 0.2–1)
BNP SERPL-MCNC: 199 PG/ML (ref 0–100)
BUN SERPL-MCNC: 11 MG/DL (ref 5–25)
CALCIUM SERPL-MCNC: 9.4 MG/DL (ref 8.4–10.2)
CARDIAC TROPONIN I PNL SERPL HS: 7 NG/L
CARDIAC TROPONIN I PNL SERPL HS: 8 NG/L
CARDIAC TROPONIN I PNL SERPL HS: 8 NG/L
CHLORIDE SERPL-SCNC: 104 MMOL/L (ref 96–108)
CO2 SERPL-SCNC: 28 MMOL/L (ref 21–32)
CREAT SERPL-MCNC: 0.89 MG/DL (ref 0.6–1.3)
EOSINOPHIL # BLD AUTO: 0.25 THOUSAND/ΜL (ref 0–0.61)
EOSINOPHIL NFR BLD AUTO: 4 % (ref 0–6)
ERYTHROCYTE [DISTWIDTH] IN BLOOD BY AUTOMATED COUNT: 12.2 % (ref 11.6–15.1)
GFR SERPL CREATININE-BSD FRML MDRD: 93 ML/MIN/1.73SQ M
GLUCOSE SERPL-MCNC: 130 MG/DL (ref 65–140)
GLUCOSE SERPL-MCNC: 143 MG/DL (ref 65–140)
GLUCOSE SERPL-MCNC: 147 MG/DL (ref 65–140)
GLUCOSE SERPL-MCNC: 235 MG/DL (ref 65–140)
HCT VFR BLD AUTO: 43.6 % (ref 36.5–49.3)
HGB BLD-MCNC: 14.6 G/DL (ref 12–17)
IMM GRANULOCYTES # BLD AUTO: 0.02 THOUSAND/UL (ref 0–0.2)
IMM GRANULOCYTES NFR BLD AUTO: 0 % (ref 0–2)
LYMPHOCYTES # BLD AUTO: 1.22 THOUSANDS/ΜL (ref 0.6–4.47)
LYMPHOCYTES NFR BLD AUTO: 17 % (ref 14–44)
MCH RBC QN AUTO: 33.2 PG (ref 26.8–34.3)
MCHC RBC AUTO-ENTMCNC: 33.5 G/DL (ref 31.4–37.4)
MCV RBC AUTO: 99 FL (ref 82–98)
MONOCYTES # BLD AUTO: 0.59 THOUSAND/ΜL (ref 0.17–1.22)
MONOCYTES NFR BLD AUTO: 8 % (ref 4–12)
NEUTROPHILS # BLD AUTO: 5.1 THOUSANDS/ΜL (ref 1.85–7.62)
NEUTS SEG NFR BLD AUTO: 71 % (ref 43–75)
NRBC BLD AUTO-RTO: 0 /100 WBCS
PLATELET # BLD AUTO: 211 THOUSANDS/UL (ref 149–390)
PMV BLD AUTO: 12 FL (ref 8.9–12.7)
POTASSIUM SERPL-SCNC: 4.3 MMOL/L (ref 3.5–5.3)
PROT SERPL-MCNC: 6.9 G/DL (ref 6.4–8.4)
RBC # BLD AUTO: 4.4 MILLION/UL (ref 3.88–5.62)
SODIUM SERPL-SCNC: 140 MMOL/L (ref 135–147)
TSH SERPL DL<=0.05 MIU/L-ACNC: 0.85 UIU/ML (ref 0.45–4.5)
WBC # BLD AUTO: 7.21 THOUSAND/UL (ref 4.31–10.16)

## 2024-10-14 PROCEDURE — 82948 REAGENT STRIP/BLOOD GLUCOSE: CPT

## 2024-10-14 PROCEDURE — 83880 ASSAY OF NATRIURETIC PEPTIDE: CPT | Performed by: PHYSICIAN ASSISTANT

## 2024-10-14 PROCEDURE — 84484 ASSAY OF TROPONIN QUANT: CPT | Performed by: PHYSICIAN ASSISTANT

## 2024-10-14 PROCEDURE — 83036 HEMOGLOBIN GLYCOSYLATED A1C: CPT | Performed by: HOSPITALIST

## 2024-10-14 PROCEDURE — 84484 ASSAY OF TROPONIN QUANT: CPT | Performed by: HOSPITALIST

## 2024-10-14 PROCEDURE — 71045 X-RAY EXAM CHEST 1 VIEW: CPT

## 2024-10-14 PROCEDURE — 99223 1ST HOSP IP/OBS HIGH 75: CPT | Performed by: HOSPITALIST

## 2024-10-14 PROCEDURE — 85025 COMPLETE CBC W/AUTO DIFF WBC: CPT | Performed by: PHYSICIAN ASSISTANT

## 2024-10-14 PROCEDURE — 96376 TX/PRO/DX INJ SAME DRUG ADON: CPT

## 2024-10-14 PROCEDURE — 93005 ELECTROCARDIOGRAM TRACING: CPT

## 2024-10-14 PROCEDURE — 80053 COMPREHEN METABOLIC PANEL: CPT | Performed by: PHYSICIAN ASSISTANT

## 2024-10-14 PROCEDURE — 99285 EMERGENCY DEPT VISIT HI MDM: CPT | Performed by: PHYSICIAN ASSISTANT

## 2024-10-14 PROCEDURE — 99285 EMERGENCY DEPT VISIT HI MDM: CPT

## 2024-10-14 PROCEDURE — 96365 THER/PROPH/DIAG IV INF INIT: CPT

## 2024-10-14 PROCEDURE — 36415 COLL VENOUS BLD VENIPUNCTURE: CPT | Performed by: PHYSICIAN ASSISTANT

## 2024-10-14 PROCEDURE — 84443 ASSAY THYROID STIM HORMONE: CPT | Performed by: PHYSICIAN ASSISTANT

## 2024-10-14 RX ORDER — ONDANSETRON 2 MG/ML
4 INJECTION INTRAMUSCULAR; INTRAVENOUS EVERY 6 HOURS PRN
Status: DISCONTINUED | OUTPATIENT
Start: 2024-10-14 | End: 2024-10-15 | Stop reason: HOSPADM

## 2024-10-14 RX ORDER — LANOLIN ALCOHOL/MO/W.PET/CERES
100 CREAM (GRAM) TOPICAL DAILY
Status: DISCONTINUED | OUTPATIENT
Start: 2024-10-15 | End: 2024-10-15 | Stop reason: HOSPADM

## 2024-10-14 RX ORDER — ACETAMINOPHEN 325 MG/1
650 TABLET ORAL EVERY 6 HOURS PRN
Status: DISCONTINUED | OUTPATIENT
Start: 2024-10-14 | End: 2024-10-15 | Stop reason: HOSPADM

## 2024-10-14 RX ORDER — DILTIAZEM HYDROCHLORIDE 30 MG/1
30 TABLET, FILM COATED ORAL EVERY 6 HOURS SCHEDULED
Status: DISCONTINUED | OUTPATIENT
Start: 2024-10-14 | End: 2024-10-15

## 2024-10-14 RX ORDER — FOLIC ACID 1 MG/1
1 TABLET ORAL DAILY
Status: DISCONTINUED | OUTPATIENT
Start: 2024-10-15 | End: 2024-10-15 | Stop reason: HOSPADM

## 2024-10-14 RX ORDER — INSULIN LISPRO 100 [IU]/ML
1-6 INJECTION, SOLUTION INTRAVENOUS; SUBCUTANEOUS
Status: DISCONTINUED | OUTPATIENT
Start: 2024-10-14 | End: 2024-10-15 | Stop reason: HOSPADM

## 2024-10-14 RX ORDER — LISINOPRIL 10 MG/1
10 TABLET ORAL DAILY
Status: DISCONTINUED | OUTPATIENT
Start: 2024-10-15 | End: 2024-10-15 | Stop reason: HOSPADM

## 2024-10-14 RX ORDER — NICOTINE 21 MG/24HR
1 PATCH, TRANSDERMAL 24 HOURS TRANSDERMAL DAILY
Status: DISCONTINUED | OUTPATIENT
Start: 2024-10-15 | End: 2024-10-14

## 2024-10-14 RX ORDER — HYDRALAZINE HYDROCHLORIDE 20 MG/ML
20 INJECTION INTRAMUSCULAR; INTRAVENOUS EVERY 6 HOURS PRN
Status: DISCONTINUED | OUTPATIENT
Start: 2024-10-14 | End: 2024-10-15 | Stop reason: HOSPADM

## 2024-10-14 RX ORDER — ENOXAPARIN SODIUM 100 MG/ML
40 INJECTION SUBCUTANEOUS DAILY
Status: DISCONTINUED | OUTPATIENT
Start: 2024-10-15 | End: 2024-10-15

## 2024-10-14 RX ORDER — DILTIAZEM HYDROCHLORIDE 5 MG/ML
10 INJECTION INTRAVENOUS ONCE
Status: COMPLETED | OUTPATIENT
Start: 2024-10-14 | End: 2024-10-14

## 2024-10-14 RX ORDER — DOCUSATE SODIUM 100 MG/1
100 CAPSULE, LIQUID FILLED ORAL 2 TIMES DAILY
Status: DISCONTINUED | OUTPATIENT
Start: 2024-10-14 | End: 2024-10-15 | Stop reason: HOSPADM

## 2024-10-14 RX ORDER — NICOTINE 21 MG/24HR
1 PATCH, TRANSDERMAL 24 HOURS TRANSDERMAL DAILY
Status: DISCONTINUED | OUTPATIENT
Start: 2024-10-14 | End: 2024-10-15 | Stop reason: HOSPADM

## 2024-10-14 RX ADMIN — NICOTINE 1 PATCH: 21 PATCH, EXTENDED RELEASE TRANSDERMAL at 17:45

## 2024-10-14 RX ADMIN — INSULIN LISPRO 3 UNITS: 100 INJECTION, SOLUTION INTRAVENOUS; SUBCUTANEOUS at 17:38

## 2024-10-14 RX ADMIN — DILTIAZEM HYDROCHLORIDE 30 MG: 30 TABLET, FILM COATED ORAL at 17:36

## 2024-10-14 RX ADMIN — DILTIAZEM HYDROCHLORIDE 10 MG: 5 INJECTION, SOLUTION INTRAVENOUS at 14:24

## 2024-10-14 RX ADMIN — DOCUSATE SODIUM 100 MG: 100 CAPSULE, LIQUID FILLED ORAL at 17:36

## 2024-10-14 RX ADMIN — DILTIAZEM HYDROCHLORIDE 1 MG/HR: 5 INJECTION, SOLUTION INTRAVENOUS at 14:48

## 2024-10-14 NOTE — ASSESSMENT & PLAN NOTE
Lab Results   Component Value Date    HGBA1C 6.9 (H) 06/04/2024       Recent Labs     10/14/24  1418   POCGLU 130       Blood Sugar Average: Last 72 hrs:  (P) 130  A1c from June of this year revealed a value of 6.9%  Patient reports that he is on no medications and that this is diet controlled  Target blood sugar for the hospital is between 140 and 180  Implement Accu-Cheks before meals and at bedtime and will treat with an according sliding scale average  Diabetic neuropathy-patient used to be on gabapentin, Lyrica, Cymbalta,-patient is not interested in restarting any of these medications.

## 2024-10-14 NOTE — ASSESSMENT & PLAN NOTE
Patient reports daily alcohol use, and he reports that his alcohol use has gotten worse since he unfortunately lost his daughter earlier this summer  Implement [facility] protocol  Start oral thiamine, folic acid, and a daily multivitamin  Cessation counseling provided

## 2024-10-14 NOTE — ASSESSMENT & PLAN NOTE
Periods of accelerated hypertension noted  Continue home dosing of lisinopril-will further optimize throughout his hospitalization  We will add as needed hydralazine-20 mg to be given IV every 6 hours as needed for systolic blood pressure greater than 160

## 2024-10-14 NOTE — H&P
H&P - Hospitalist   Name: Oscar Hardin 59 y.o. male I MRN: [id number]  Unit/Bed#: [facility] I Date of Admission: 10/14/2024   Date of Service: 10/14/2024 I Hospital Day: 0     Assessment & Plan  Atrial fibrillation with rapid ventricular response (HCC)  Admit to stepdown level 2 observation  Unspecified exact etiology-this is a new diagnosis of A-fib with this patient  Could be related to a longstanding history of daily alcohol use  We will check a 2D echocardiogram  High-sensitivity troponin testing is negative thus far  Consult cardiology  TSH is within normal limits  Monitor electrolytes inclusive of magnesium, and phosphorus  Continue Cardizem drip as initiated in the ED  Will start concomitant oral diltiazem at 30 mg p.o. every 6 hours  DASH 2 DS 2-VASc stroke risk score is 2  Prior to starting anticoagulation, we will check an echocardiogram to rule out the possibility of any underlying valvular disorders  Repeat blood work in the a.m.  DMII (diabetes mellitus, type 2) (Formerly [facility])  Lab Results   Component Value Date    HGBA1C 6.9 (H) 06/04/2024       Recent Labs     10/14/24  1418   POCGLU 130       Blood Sugar Average: Last 72 hrs:  (P) 130  A1c from June of this year revealed a value of 6.9%  Patient reports that he is on no medications and that this is diet controlled  Target blood sugar for the hospital is between 140 and 180  Implement Accu-Cheks before meals and at bedtime and will treat with an according sliding scale average  Diabetic neuropathy-patient used to be on gabapentin, Lyrica, Cymbalta,-patient is not interested in restarting any of these medications.  Hypertension  Periods of accelerated hypertension noted  Continue home dosing of lisinopril-will further optimize throughout his hospitalization  We will add as needed hydralazine-20 mg to be given IV every 6 hours as needed for systolic blood pressure greater than 160  Alcohol use  Patient reports daily alcohol use, and he reports that his alcohol use  "has gotten worse since he unfortunately lost his daughter earlier this summer  Implement CIWA protocol  Start oral thiamine, folic acid, and a daily multivitamin  Cessation counseling provided  Tobacco use  Cessation counseling provided  We will treat with a nicotine patch      VTE Pharmacologic Prophylaxis: VTE Score: 4 Moderate Risk (Score 3-4) - Pharmacological DVT Prophylaxis Ordered: enoxaparin (Lovenox).  Code Status: Level 1 - Full Code reviewed with patient and any family members or friend  Discussion with family: Updated  () at bedside.    Anticipated Length of Stay: Patient will be admitted on an observation basis with an anticipated length of stay of less than 2 midnights secondary to management of A-fib with RVR.    History of Present Illness   Chief Complaint: \"I was sent in by my PCP because of ongoing intermittent palpitations that started approximately 3 months ago\"    Oscar Hardin is a 59 y.o. male with a PMH of the medical conditions as outlined below who presents with the chief complaint as outlined above.    Patient states that he was doing okay up until a few months ago.  Approximately 3 months ago he started experiencing intermittent episodes of palpitations.  He did not think much of what was going on was because unfortunately he recently lost his adult daughter, and has been experiencing a lot of grief.    He denies any chest pain, nausea, vomiting, diarrhea, fevers, chills, numbness, and/or weakness.  He endorses some numbness and tingling of his upper extremities which he attributes to his known history of having neuropathy.    He went to his PCPs office today for an annual evaluation, and in the PCPs office he was found to be in a state of atrial fibrillation with rapid ventricular response and was then sent over to the emergency room for further evaluation.    In the emergency room, he was indeed found to be in a state of A-fib with RVR, was started on IV Cardizem " drip, and then was subsequently admitted to the hospital.    Review of Systems   Constitutional:  Negative for appetite change, chills, diaphoresis, fatigue and fever.   Respiratory:  Negative for cough, chest tightness and shortness of breath.    Cardiovascular:  Negative for chest pain, palpitations and leg swelling.   Gastrointestinal:  Negative for abdominal pain, blood in stool, constipation, diarrhea, nausea and vomiting.   Genitourinary:  Negative for difficulty urinating, dysuria, hematuria and urgency.   Skin:  Negative for color change and rash.   Allergic/Immunologic: Negative for food allergies.   Neurological:  Negative for dizziness, weakness, numbness and headaches.   All other systems reviewed and are negative.      Historical Information   Past Medical History:   Diagnosis Date    DMII (diabetes mellitus, type 2) (HCC) 02/06/2021    Hypercholesteremia 02/06/2021    Hypertension 02/06/2021    IBS (irritable bowel syndrome)     Constipation predominant    Lyme disease     Migraine 09/13/2017    Osteoarthritis     Post concussive encephalopathy 09/29/2017    RSD (reflex sympathetic dystrophy) 02/06/2021     Past Surgical History:   Procedure Laterality Date    CHOLECYSTECTOMY       Social History     Tobacco Use    Smoking status: Every Day     Current packs/day: 1.00     Average packs/day: 1 pack/day for 25.0 years (25.0 ttl pk-yrs)     Types: Cigarettes    Smokeless tobacco: Never   Vaping Use    Vaping status: Never Used   Substance and Sexual Activity    Alcohol use: Yes     Alcohol/week: 7.0 standard drinks of alcohol     Types: 7 Standard drinks or equivalent per week     Comment: daily    Drug use: Never    Sexual activity: Not on file     E-Cigarette/Vaping    E-Cigarette Use Never User      E-Cigarette/Vaping Substances     Family History   Problem Relation Age of Onset    Skin cancer Mother     Cervical cancer Daughter      Social History:  Marital Status: /Civil Union   Occupation: Is  no longer working, previously he was a   Patient Pre-hospital Living Situation: Home  Patient Pre-hospital Level of Mobility: walks  Patient Pre-hospital Diet Restrictions: No prehospital dietary restrictions in place    Meds/Allergies   I have reviewed home medications with patient personally.  Prior to Admission medications    Medication Sig Start Date End Date Taking? Authorizing Provider   ASHWAGANDHA PO Take by mouth    Historical Provider, MD   BIOTIN PO Take by mouth    Historical Provider, MD   cyclobenzaprine (FLEXERIL) 10 mg tablet  11/27/23   Historical Provider, MD   linaCLOtide (Linzess) 290 MCG CAPS Take 1 capsule by mouth in the morning 30 min before breakfast  Patient not taking: Reported on 12/1/2023 9/13/22   HARISH Maki   lisinopril (ZESTRIL) 10 mg tablet Take 10 mg by mouth daily    Historical Provider, MD   MILK THISTLE PO Take by mouth    Historical Provider, MD   MULTIPLE VITAMIN PO Take by mouth    Historical Provider, MD   Niacin (VITAMIN B-3 PO) Take by mouth    Historical Provider, MD   RESVERATROL PO Take by mouth    Historical Provider, MD   Sod Picosulfate-Mag Ox-Cit Acd (Clenpiq) 10-3.5-12 MG-GM -GM/160ML SOLN Take 1 kit by mouth see administration instructions  Patient not taking: Reported on 12/1/2023 9/13/22   HARISH Maki   tadalafil (CIALIS) 5 MG tablet Take 5 mg by mouth in the morning    Historical Provider, MD   TURMERIC PO Take by mouth    Historical Provider, MD     No Known Allergies    Objective :  Temp:  [97.9 °F (36.6 °C)] 97.9 °F (36.6 °C)  HR:  [110-125] 110  BP: (175)/(110) 175/110  Resp:  [20] 20  SpO2:  [99 %] 99 %  O2 Device: None (Room air)    Physical Exam  Vitals and nursing note reviewed.   Constitutional:       General: He is not in acute distress.     Appearance: Normal appearance. He is not ill-appearing.   HENT:      Head: Normocephalic and atraumatic.      Nose: Nose normal.   Eyes:      Extraocular Movements:  Extraocular movements intact.      Pupils: Pupils are equal, round, and reactive to light.   Cardiovascular:      Rate and Rhythm: Tachycardia present. Rhythm irregular.      Pulses: Normal pulses.      Heart sounds: Normal heart sounds. No murmur heard.     No friction rub. No gallop.   Pulmonary:      Effort: Pulmonary effort is normal.      Breath sounds: Normal breath sounds.   Abdominal:      General: There is no distension.      Palpations: Abdomen is soft. There is no mass.      Tenderness: There is no abdominal tenderness. There is no guarding or rebound.   Musculoskeletal:         General: No swelling or tenderness. Normal range of motion.      Cervical back: Normal range of motion and neck supple. No rigidity. No muscular tenderness.      Right lower leg: No edema.      Left lower leg: No edema.   Skin:     General: Skin is warm.      Capillary Refill: Capillary refill takes less than 2 seconds.      Findings: No erythema or rash.   Neurological:      General: No focal deficit present.      Mental Status: He is alert and oriented to person, place, and time. Mental status is at baseline.   Psychiatric:         Mood and Affect: Mood normal.         Behavior: Behavior normal.              Lab Results: I have reviewed the following results:  Results from last 7 days   Lab Units 10/14/24  1423   WBC Thousand/uL 7.21   HEMOGLOBIN g/dL 14.6   HEMATOCRIT % 43.6   PLATELETS Thousands/uL 211   SEGS PCT % 71   LYMPHO PCT % 17   MONO PCT % 8   EOS PCT % 4     Results from last 7 days   Lab Units 10/14/24  1423   SODIUM mmol/L 140   POTASSIUM mmol/L 4.3   CHLORIDE mmol/L 104   CO2 mmol/L 28   BUN mg/dL 11   CREATININE mg/dL 0.89   ANION GAP mmol/L 8   CALCIUM mg/dL 9.4   ALBUMIN g/dL 4.3   TOTAL BILIRUBIN mg/dL 0.65   ALK PHOS U/L 64   ALT U/L 27   AST U/L 14   GLUCOSE RANDOM mg/dL 143*         Results from last 7 days   Lab Units 10/14/24  1418   POC GLUCOSE mg/dl 130     Lab Results   Component Value Date    HGBA1C  6.9 (H) 06/04/2024    HGBA1C 6.6 (H) 12/05/2023    HGBA1C 6.6 (H) 06/13/2023           Imaging Results Review: I reviewed radiology reports from this admission including: chest xray.  Other Study Results Review: EKG was reviewed.     Administrative Statements   I have spent a total time of 65 minutes in caring for this patient on the day of the visit/encounter including Diagnostic results, Prognosis, Risks and benefits of tx options, Instructions for management, Patient and family education, Importance of tx compliance, Risk factor reductions, Impressions, Counseling / Coordination of care, Documenting in the medical record, Reviewing / ordering tests, medicine, procedures  , Obtaining or reviewing history  , and Communicating with other healthcare professionals .    ** Please Note: This note has been constructed using a voice recognition system. **

## 2024-10-14 NOTE — ASSESSMENT & PLAN NOTE
Admit to stepdown level 2 observation  Unspecified exact etiology-this is a new diagnosis of A-fib with this patient  Could be related to a longstanding history of daily alcohol use  We will check a 2D echocardiogram  High-sensitivity troponin testing is negative thus far  Consult cardiology  TSH is within normal limits  Monitor electrolytes inclusive of magnesium, and phosphorus  Continue Cardizem drip as initiated in the ED  Will start concomitant oral diltiazem at 30 mg p.o. every 6 hours  DASH 2 DS 2-VASc stroke risk score is 2  Prior to starting anticoagulation, we will check an echocardiogram to rule out the possibility of any underlying valvular disorders  Repeat blood work in the a.m.

## 2024-10-14 NOTE — ED PROVIDER NOTES
Time reflects when diagnosis was documented in both MDM as applicable and the Disposition within this note       Time User Action Codes Description Comment    10/14/2024  3:39 PM Rafy Perdomo Add [I48.91] Atrial fibrillation with rapid ventricular response (HCC)     10/14/2024  3:41 PM Rafy Perdomo Add [I48.91] New onset a-fib (HCC)           ED Disposition       ED Disposition   Admit    Condition   Stable    Date/Time   Mon Oct 14, 2024  3:41 PM    Comment   Case was discussed with Dr Art and the patient's admission status was agreed to be Admission Status: observation status to the service of Dr. Art .               Assessment & Plan       Medical Decision Making  This is a 59-year-old male patient who went for his checkup with his family doctor stated he had palpitations found to be in A-fib shows up today rate at 125 bpm A-fib.  No other complaints except for palpitations no shortness of breath or chest pain.  After talking with them this been off and on for several months.  He does drink alcohol on a daily basis but does not ever get withdrawal symptoms such as holiday heart.  He offers no other symptoms.  Differential diagnose include not limited to new onset A-fib, electrolyte abnormality, ACS, thyroid abnormality    Problems Addressed:  Atrial fibrillation with rapid ventricular response (HCC): acute illness or injury     Details: Patient was found to be in new onset A-fib given IV bolus of Cardizem followed by a drip will be admitted to the hospital  New onset a-fib (HCC): acute illness or injury     Details: Patient requires admission for further treatment and evaluation    Amount and/or Complexity of Data Reviewed  External Data Reviewed: labs and ECG.     Details: Reviewed labs and EKGs for comparison  Labs: ordered. Decision-making details documented in ED Course.     Details: All labs were reviewed by myself there is no acute finding requires immediate intervention  Radiology: ordered.      Details: I personally interpreted the chest x-ray there isno acute finding  ECG/medicine tests: ordered and independent interpretation performed. Decision-making details documented in ED Course.     Details: I personally interpreted the patient's EKG and showed rapid A-fib with ventricular response no ST elevation  Discussion of management or test interpretation with external provider(s): Using joint decision-making with the patient and Dr. Velasquez patient be admitted to the hospital stepdown for new onset A-fib    Risk  Prescription drug management.  Decision regarding hospitalization.        ED Course as of 10/15/24 1210   Mon Oct 14, 2024   1412 Keith score of 2       Medications   lisinopril (ZESTRIL) tablet 10 mg (has no administration in time range)   acetaminophen (TYLENOL) tablet 650 mg (has no administration in time range)   docusate sodium (COLACE) capsule 100 mg (has no administration in time range)   ondansetron (ZOFRAN) injection 4 mg (has no administration in time range)   hydrALAZINE (APRESOLINE) injection 20 mg (has no administration in time range)   diltiazem (CARDIZEM) injection 10 mg (10 mg Intravenous Given 10/14/24 1424)       ED Risk Strat Scores                CIWA-Ar Score       Row Name 10/15/24 0400 10/15/24 0000 10/14/24 1711       CIWA-Ar    Nausea and Vomiting 0 0 0    Tactile Disturbances 0 0 0    Tremor 0 0 0    Auditory Disturbances 0 0 0    Paroxysmal Sweats 0 0 0    Visual Disturbances 0 0 0    Anxiety 0 0 0    Headache, Fullness in Head 0 0 0    Agitation 0 0 0    Orientation and Clouding of Sensorium 0 0 0    CIWA-Ar Total 0 0 0                                                History of Present Illness       Chief Complaint   Patient presents with    Rapid Heart Rate     Sent in by family doctor for evaluation of  a-fib       Past Medical History:   Diagnosis Date    DMII (diabetes mellitus, type 2) (HCC) 02/06/2021    Hypercholesteremia 02/06/2021    Hypertension 02/06/2021    IBS  (irritable bowel syndrome)     Constipation predominant    Lyme disease     Migraine 09/13/2017    Osteoarthritis     Post concussive encephalopathy 09/29/2017    RSD (reflex sympathetic dystrophy) 02/06/2021      Past Surgical History:   Procedure Laterality Date    CHOLECYSTECTOMY        Family History   Problem Relation Age of Onset    Skin cancer Mother     Cervical cancer Daughter       Social History     Tobacco Use    Smoking status: Every Day     Current packs/day: 1.00     Average packs/day: 1 pack/day for 25.0 years (25.0 ttl pk-yrs)     Types: Cigarettes    Smokeless tobacco: Never   Vaping Use    Vaping status: Never Used   Substance Use Topics    Alcohol use: Yes     Alcohol/week: 7.0 standard drinks of alcohol     Types: 7 Standard drinks or equivalent per week     Comment: daily    Drug use: Never      E-Cigarette/Vaping    E-Cigarette Use Never User       E-Cigarette/Vaping Substances      I have reviewed and agree with the history as documented.     This is a 59-year-old male patient who went in for a checkup with his family doctor he was at the family doctor know that he has palpitations for several days now and he gets them intermittently.  He came in today in A-fib 125 beats a minute.  No chest pain or shortness of breath no swelling of his ankles.  States he has been getting this over the last several months lasting short amount of time and has been the longest duration.  He does endorse drinking about a quarter of a bottle of whiskey a day but has not had any pauses and has never had withdrawal symptoms never gets the shakes..  Denies any fever chills headache blurred vision double vision cough cancer sore throat no chest pain no pleuritic pain no nausea vomiting diarrhea or abdominal pain no leg edema.  Nothing makes it better or worse.  Does have a history of hypertension, type 2 diabetes        Review of Systems   Constitutional:  Negative for chills, diaphoresis, fatigue and fever.   HENT:   Negative for congestion, ear pain, nosebleeds and sore throat.    Eyes:  Negative for photophobia, pain, discharge and visual disturbance.   Respiratory:  Negative for cough, choking, chest tightness, shortness of breath and wheezing.    Cardiovascular:  Positive for palpitations. Negative for chest pain and leg swelling.   Gastrointestinal:  Negative for abdominal distention, abdominal pain, diarrhea and vomiting.   Genitourinary:  Negative for dysuria, flank pain, frequency and hematuria.   Musculoskeletal:  Negative for arthralgias, back pain, gait problem and joint swelling.   Skin:  Negative for color change and rash.   Neurological:  Negative for dizziness, seizures, syncope and headaches.   Psychiatric/Behavioral:  Negative for behavioral problems and confusion. The patient is not nervous/anxious.    All other systems reviewed and are negative.          Objective       ED Triage Vitals [10/14/24 1402]   Temperature Pulse Blood Pressure Respirations SpO2 Patient Position - Orthostatic VS   97.9 °F (36.6 °C) (!) 125 (!) 175/110 20 99 % Lying      Temp Source Heart Rate Source BP Location FiO2 (%) Pain Score    Temporal Monitor Left arm -- No Pain      Vitals      Date and Time Temp Pulse SpO2 Resp BP Pain Score FACES Pain Rating User   10/15/24 1117 -- -- -- -- 161/107 -- -- NA   10/15/24 1116 -- -- -- -- 146/108 -- -- LAB   10/15/24 1116 98.4 °F (36.9 °C) -- -- -- -- -- -- NA   10/15/24 1000 -- 92 95 % 16 -- -- -- LAB   10/15/24 0936 -- 91 -- -- 125/87 -- -- EM   10/15/24 0936 -- -- 97 % 18 -- -- -- LAB   10/15/24 0900 -- 80 96 % 18 -- -- -- LAB   10/15/24 0800 -- 115 96 % 20 146/108 No Pain -- LAB   10/15/24 0717 97.8 °F (36.6 °C) -- -- -- 125/87 -- -- NA   10/15/24 0614 -- -- -- -- 133/83 -- -- JG   10/15/24 0510 -- 79 -- -- -- -- -- TK   10/15/24 0400 97.3 °F (36.3 °C) 76 96 % 15 128/85 No Pain -- TK   10/15/24 0019 -- 75 -- -- -- -- -- TK   10/15/24 0018 -- -- -- -- 131/76 -- -- TK   10/15/24 0002 -- 76 --  -- -- -- --    10/15/24 0000 97.5 °F (36.4 °C) 73 95 % 19 131/76 No Pain -- TK   10/14/24 2301 -- 80 -- -- -- -- -- TK   10/14/24 2217 -- 78 -- -- -- -- -- TK   10/14/24 2000 97.5 °F (36.4 °C) 110 96 % 22 150/90 No Pain --    10/14/24 1900 -- 83 96 % 17 148/83 -- -- [facility]   10/14/24 1800 -- 100 98 % 17 145/98 -- -- [facility]   10/14/24 1729 -- 121 96 % 43 135/100 No Pain -- [facility]   10/14/24 1719 -- 113 98 % 23 151/99 -- -- [facility]   10/14/24 1711 -- 124 98 % 25 -- -- -- [facility]   10/14/24 1642 97.2 °F (36.2 °C) 116 -- 26 176/103 -- -- [facility]   10/14/24 1600 -- 117 98 % 23 172/91 -- -- [facility]   10/14/24 1448 -- 110 -- -- -- -- --    10/14/24 1402 97.9 °F (36.6 °C) 125 99 % 20 175/110 No Pain -- [facility]            Physical Exam  Vitals and nursing note reviewed.   Constitutional:       General: He is not in acute distress.     Appearance: Normal appearance. He is well-developed. He is not ill-appearing, toxic-appearing or diaphoretic.   HENT:      Head: Normocephalic and atraumatic.      Right Ear: Tympanic membrane, ear canal and external ear normal.      Left Ear: Tympanic membrane, ear canal and external ear normal.      Nose: Nose normal.      Mouth/Throat:      Mouth: Mucous membranes are moist.      Pharynx: Oropharynx is clear. No oropharyngeal exudate or posterior oropharyngeal erythema.   Eyes:      General: No scleral icterus.        Right eye: No discharge.         Left eye: No discharge.      Conjunctiva/sclera: Conjunctivae normal.      Pupils: Pupils are equal, round, and reactive to light.   Cardiovascular:      Rate and Rhythm: Tachycardia present. Rhythm irregular.   Pulmonary:      Effort: Pulmonary effort is normal.      Breath sounds: Normal breath sounds.   Abdominal:      General: Bowel sounds are normal.      Palpations: Abdomen is soft.      Tenderness: There is no abdominal tenderness.   Musculoskeletal:         General: Normal range of motion.      Cervical back: Normal range of motion and neck supple.      Right  lower leg: No edema.      Left lower leg: No edema.   Skin:     General: Skin is warm.      Capillary Refill: Capillary refill takes less than 2 seconds.   Neurological:      General: No focal deficit present.      Mental Status: He is alert and oriented to person, place, and time. Mental status is at baseline.   Psychiatric:         Mood and Affect: Mood normal.         Behavior: Behavior normal.         Results Reviewed       Procedure Component Value Units Date/Time    Comprehensive metabolic panel [[id number]]  (Abnormal) Collected: 10/15/24 0503    Lab Status: Final result Specimen: Blood from Arm, Right Updated: 10/15/24 0556     Sodium 140 mmol/L      Potassium 3.9 mmol/L      Chloride 109 mmol/L      CO2 27 mmol/L      ANION GAP 4 mmol/L      BUN 12 mg/dL      Creatinine 0.80 mg/dL      Glucose 152 mg/dL      Calcium 8.8 mg/dL      AST 14 U/L      ALT 24 U/L      Alkaline Phosphatase 60 U/L      Total Protein 6.2 g/dL      Albumin 3.9 g/dL      Total Bilirubin 0.46 mg/dL      eGFR 97 ml/min/1.73sq m     Narrative:      National Kid[employer] Disease Foundation guidelines for Chronic Kidney Disease (CKD):     Stage 1 with normal or high GFR (GFR > 90 mL/min/1.73 square meters)    Stage 2 Mild CKD (GFR = 60-89 mL/min/1.73 square meters)    Stage 3A Moderate CKD (GFR = 45-59 mL/min/1.73 square meters)    Stage 3B Moderate CKD (GFR = 30-44 mL/min/1.73 square meters)    Stage 4 Severe CKD (GFR = 15-29 mL/min/1.73 square meters)    Stage 5 End Stage CKD (GFR <15 mL/min/1.73 square meters)  Note: GFR calculation is accurate only with a steady state creatinine    Magnesium [[id number]]  (Normal) Collected: 10/15/24 0503    Lab Status: Final result Specimen: Blood from Arm, Right Updated: 10/15/24 0556     Magnesium 2.2 mg/dL     Phosphorus [[id number]]  (Normal) Collected: 10/15/24 0503    Lab Status: Final result Specimen: Blood from Arm, Right Updated: 10/15/24 0556     Phosphorus 2.8 mg/dL     Hemoglobin A1c w/EAG  Estimation (Prechecked if no A1C within 90 days) [[id number]]  (Abnormal) Collected: 10/14/24 1423    Lab Status: Final result Specimen: Blood from Arm, Right Updated: 10/15/24 0437     Hemoglobin A1C 7.2 %       mg/dl     HS Troponin I 4hr [[id number]]  (Normal) Collected: 10/14/24 1806    Lab Status: Final result Specimen: Blood from Arm, Right Updated: 10/14/24 1840     hs TnI 4hr 8 ng/L      Delta 4hr hsTnI 0 ng/L     HS Troponin I 2hr [[id number]]  (Normal) Collected: 10/14/24 1615    Lab Status: Final result Specimen: Blood from Arm, Left Updated: 10/14/24 1644     hs TnI 2hr 7 ng/L      Delta 2hr hsTnI -1 ng/L     TSH [[id number]]  (Normal) Collected: 10/14/24 1423    Lab Status: Final result Specimen: Blood from Arm, Right Updated: 10/14/24 1549     TSH 3RD GENERATON 0.848 uIU/mL     HS Troponin 0hr (reflex protocol) [[id number]]  (Normal) Collected: 10/14/24 1423    Lab Status: Final result Specimen: Blood from Arm, Right Updated: 10/14/24 1519     hs TnI 0hr 8 ng/L     B-Type Natriuretic Peptide(BNP) [[id number]]  (Abnormal) Collected: 10/14/24 1423    Lab Status: Final result Specimen: Blood from Arm, Right Updated: 10/14/24 1518      pg/mL     Comprehensive metabolic panel [[id number]]  (Abnormal) Collected: 10/14/24 1423    Lab Status: Final result Specimen: Blood from Arm, Right Updated: 10/14/24 1512     Sodium 140 mmol/L      Potassium 4.3 mmol/L      Chloride 104 mmol/L      CO2 28 mmol/L      ANION GAP 8 mmol/L      BUN 11 mg/dL      Creatinine 0.89 mg/dL      Glucose 143 mg/dL      Calcium 9.4 mg/dL      AST 14 U/L      ALT 27 U/L      Alkaline Phosphatase 64 U/L      Total Protein 6.9 g/dL      Albumin 4.3 g/dL      Total Bilirubin 0.65 mg/dL      eGFR 93 ml/min/1.73sq m     Narrative:      National Kidney Disease Foundation guidelines for Chronic Kidney Disease (CKD):     Stage 1 with normal or high GFR (GFR > 90 mL/min/1.73 square meters)    Stage 2 Mild CKD (GFR = 60-89 mL/min/1.73  square meters)    Stage 3A Moderate CKD (GFR = 45-59 mL/min/1.73 square meters)    Stage 3B Moderate CKD (GFR = 30-44 mL/min/1.73 square meters)    Stage 4 Severe CKD (GFR = 15-29 mL/min/1.73 square meters)    Stage 5 End Stage CKD (GFR <15 mL/min/1.73 square meters)  Note: GFR calculation is accurate only with a steady state creatinine    CBC and differential [[id number]]  (Abnormal) Collected: 10/14/24 1423    Lab Status: Final result Specimen: Blood from Arm, Right Updated: 10/14/24 1511     WBC 7.21 Thousand/uL      RBC 4.40 Million/uL      Hemoglobin 14.6 g/dL      Hematocrit 43.6 %      MCV 99 fL      MCH 33.2 pg      MCHC 33.5 g/dL      RDW 12.2 %      MPV 12.0 fL      Platelets 211 Thousands/uL      nRBC 0 /100 WBCs      Segmented % 71 %      Immature Grans % 0 %      Lymphocytes % 17 %      Monocytes % 8 %      Eosinophils Relative 4 %      Basophils Relative 0 %      Absolute Neutrophils 5.10 Thousands/µL      Absolute Immature Grans 0.02 Thousand/uL      Absolute Lymphocytes 1.22 Thousands/µL      Absolute Monocytes 0.59 Thousand/µL      Eosinophils Absolute 0.25 Thousand/µL      Basophils Absolute 0.03 Thousands/µL     Fingerstick Glucose (POCT) [[id number]]  (Normal) Collected: 10/14/24 1418    Lab Status: Final result Specimen: Blood Updated: 10/14/24 1419     POC Glucose 130 mg/dl             XR chest 1 view portable   Final Interpretation by Mary Carmen Howard MD (10/15 0547)      No acute cardiopulmonary disease.            Workstation performed: [id number]             Procedures    ED Medication and Procedure Management   Prior to Admission Medications   Prescriptions Last Dose Informant Patient Reported? Taking?   ASHWAGANDHA PO Past Week  Yes Yes   Sig: Take by mouth   BIOTIN PO Past Week  Yes Yes   Sig: Take by mouth   MILK THISTLE PO   Yes No   Sig: Take by mouth   MULTIPLE VITAMIN PO   Yes No   Sig: Take by mouth   Niacin (VITAMIN B-3 PO)   Yes No   Sig: Take by mouth   RESVERATROL PO   Yes No    Sig: Take by mouth   Sod Picosulfate-Mag Ox-Cit Acd (Clenpiq) 10-3.5-12 MG-GM -GM/160ML SOLN Not Taking  No No   Sig: Take 1 kit by mouth see administration instructions   Patient not taking: Reported on 12/1/2023   TURMERIC PO   Yes No   Sig: Take by mouth   cyclobenzaprine (FLEXERIL) 10 mg tablet Past Week  Yes Yes   diltiazem (CARDIZEM CD) 120 mg 24 hr capsule   No No   Sig: Take 1 capsule (120 mg total) by mouth daily   linaCLOtide (Linzess) 290 MCG CAPS Not Taking  No No   Sig: Take 1 capsule by mouth in the morning 30 min before breakfast   Patient not taking: Reported on 12/1/2023   lisinopril (ZESTRIL) 10 mg tablet   Yes No   Sig: Take 10 mg by mouth daily   tadalafil (CIALIS) 5 MG tablet   Yes No   Sig: Take 5 mg by mouth in the morning      Facility-Administered Medications: None     Current Discharge Medication List        START taking these medications    Details   apixaban (ELIQUIS) 5 mg Take 1 tablet (5 mg total) by mouth 2 (two) times a day  Qty: 60 tablet, Refills: 0    Associated Diagnoses: Atrial fibrillation with rapid ventricular response (HCC)           CONTINUE these medications which have CHANGED    Details   diltiazem (CARDIZEM CD) 120 mg 24 hr capsule Take 1 capsule (120 mg total) by mouth daily  Qty: 30 capsule, Refills: 0    Associated Diagnoses: Atrial fibrillation with rapid ventricular response (HCC)           CONTINUE these medications which have NOT CHANGED    Details   ASHWAGANDHA PO Take by mouth      BIOTIN PO Take by mouth      cyclobenzaprine (FLEXERIL) 10 mg tablet       lisinopril (ZESTRIL) 10 mg tablet Take 10 mg by mouth daily      MILK THISTLE PO Take by mouth      MULTIPLE VITAMIN PO Take by mouth      Niacin (VITAMIN B-3 PO) Take by mouth      RESVERATROL PO Take by mouth      tadalafil (CIALIS) 5 MG tablet Take 5 mg by mouth in the morning      TURMERIC PO Take by mouth           STOP taking these medications       linaCLOtide (Linzess) 290 MCG CAPS Comments:   Reason  for Stopping:         Sod Picosulfate-Mag Ox-Cit Acd (Clenpiq) 10-3.5-12 MG-GM -GM/160ML SOLN Comments:   Reason for Stopping:             Outpatient Discharge Orders   Ambulatory referral to Cardiology   Standing Status: Future Standing Exp. Date: 10/15/25      Discharge Diet     Activity as tolerated     Call provider for:  persistent nausea or vomiting     Call provider for:  severe uncontrolled pain     Call provider for: active or persistent bleeding     Call provider for:  difficulty breathing, headache or visual disturbances     Call provider for:  persistent dizziness or light-headedness     Call provider for:  extreme fatigue     No dressing needed     ED SEPSIS DOCUMENTATION   Time reflects when diagnosis was documented in both MDM as applicable and the Disposition within this note       Time User Action Codes Description Comment    10/14/2024  3:39 PM Rafy Avelar Add [I48.91] Atrial fibrillation with rapid ventricular response (HCC)     10/14/2024  3:41 PM Rafy Avelar [I48.91] New onset a-fib (HCC)                  Rafy Avelar PA-C  10/15/24 1210

## 2024-10-15 ENCOUNTER — APPOINTMENT (OUTPATIENT)
Dept: NON INVASIVE DIAGNOSTICS | Facility: HOSPITAL | Age: 59
End: 2024-10-15
Payer: COMMERCIAL

## 2024-10-15 VITALS
BODY MASS INDEX: 27.92 KG/M2 | OXYGEN SATURATION: 95 % | TEMPERATURE: 98.4 F | RESPIRATION RATE: 19 BRPM | WEIGHT: 195 LBS | HEART RATE: 117 BPM | DIASTOLIC BLOOD PRESSURE: 107 MMHG | HEIGHT: 70 IN | SYSTOLIC BLOOD PRESSURE: 161 MMHG

## 2024-10-15 DIAGNOSIS — I48.91 ATRIAL FIBRILLATION WITH RAPID VENTRICULAR RESPONSE (HCC): Primary | ICD-10-CM

## 2024-10-15 LAB
ALBUMIN SERPL BCG-MCNC: 3.9 G/DL (ref 3.5–5)
ALP SERPL-CCNC: 60 U/L (ref 34–104)
ALT SERPL W P-5'-P-CCNC: 24 U/L (ref 7–52)
ANION GAP SERPL CALCULATED.3IONS-SCNC: 4 MMOL/L (ref 4–13)
AORTIC ROOT: 3.8 CM
APICAL FOUR CHAMBER EJECTION FRACTION: 54 %
AST SERPL W P-5'-P-CCNC: 14 U/L (ref 13–39)
AV LVOT MEAN GRADIENT: 2 MMHG
AV LVOT PEAK GRADIENT: 4 MMHG
BILIRUB SERPL-MCNC: 0.46 MG/DL (ref 0.2–1)
BSA FOR ECHO PROCEDURE: 2.06 M2
BUN SERPL-MCNC: 12 MG/DL (ref 5–25)
CALCIUM SERPL-MCNC: 8.8 MG/DL (ref 8.4–10.2)
CHLORIDE SERPL-SCNC: 109 MMOL/L (ref 96–108)
CO2 SERPL-SCNC: 27 MMOL/L (ref 21–32)
CREAT SERPL-MCNC: 0.8 MG/DL (ref 0.6–1.3)
DOP CALC LVOT PEAK VEL VTI: 15.8 CM
DOP CALC LVOT PEAK VEL: 0.96 M/S
EST. AVERAGE GLUCOSE BLD GHB EST-MCNC: 160 MG/DL
FRACTIONAL SHORTENING: 29 (ref 28–44)
GFR SERPL CREATININE-BSD FRML MDRD: 97 ML/MIN/1.73SQ M
GLUCOSE SERPL-MCNC: 147 MG/DL (ref 65–140)
GLUCOSE SERPL-MCNC: 152 MG/DL (ref 65–140)
GLUCOSE SERPL-MCNC: 161 MG/DL (ref 65–140)
HBA1C MFR BLD: 7.2 %
INTERVENTRICULAR SEPTUM IN DIASTOLE (PARASTERNAL SHORT AXIS VIEW): 1 CM
INTERVENTRICULAR SEPTUM: 1 CM (ref 0.6–1.1)
LAAS-AP2: 22.1 CM2
LAAS-AP4: 8.6 CM2
LEFT ATRIUM SIZE: 3.6 CM
LEFT ATRIUM VOLUME (MOD BIPLANE): 40 ML
LEFT ATRIUM VOLUME INDEX (MOD BIPLANE): 19.3 ML/M2
LEFT INTERNAL DIMENSION IN SYSTOLE: 3.4 CM (ref 2.1–4)
LEFT VENTRICULAR INTERNAL DIMENSION IN DIASTOLE: 4.8 CM (ref 3.5–6)
LEFT VENTRICULAR POSTERIOR WALL IN END DIASTOLE: 1 CM
LEFT VENTRICULAR STROKE VOLUME: 61 ML
LVSV (TEICH): 61 ML
MAGNESIUM SERPL-MCNC: 2.2 MG/DL (ref 1.9–2.7)
PHOSPHATE SERPL-MCNC: 2.8 MG/DL (ref 2.7–4.5)
POTASSIUM SERPL-SCNC: 3.9 MMOL/L (ref 3.5–5.3)
PROT SERPL-MCNC: 6.2 G/DL (ref 6.4–8.4)
RIGHT ATRIUM AREA SYSTOLE A4C: 20.2 CM2
RIGHT VENTRICLE ID DIMENSION: 2.9 CM
SL CV LEFT ATRIUM LENGTH A2C: 5.5 CM
SL CV LV EF: 60
SL CV PED ECHO LEFT VENTRICLE DIASTOLIC VOLUME (MOD BIPLANE) 2D: 109 ML
SL CV PED ECHO LEFT VENTRICLE SYSTOLIC VOLUME (MOD BIPLANE) 2D: 48 ML
SODIUM SERPL-SCNC: 140 MMOL/L (ref 135–147)
TRICUSPID ANNULAR PLANE SYSTOLIC EXCURSION: 1.9 CM

## 2024-10-15 PROCEDURE — 83735 ASSAY OF MAGNESIUM: CPT | Performed by: HOSPITALIST

## 2024-10-15 PROCEDURE — 93306 TTE W/DOPPLER COMPLETE: CPT

## 2024-10-15 PROCEDURE — 80053 COMPREHEN METABOLIC PANEL: CPT | Performed by: HOSPITALIST

## 2024-10-15 PROCEDURE — 82948 REAGENT STRIP/BLOOD GLUCOSE: CPT

## 2024-10-15 PROCEDURE — 84100 ASSAY OF PHOSPHORUS: CPT | Performed by: HOSPITALIST

## 2024-10-15 PROCEDURE — 93306 TTE W/DOPPLER COMPLETE: CPT | Performed by: INTERNAL MEDICINE

## 2024-10-15 PROCEDURE — 99204 OFFICE O/P NEW MOD 45 MIN: CPT | Performed by: INTERNAL MEDICINE

## 2024-10-15 PROCEDURE — 99239 HOSP IP/OBS DSCHRG MGMT >30: CPT | Performed by: HOSPITALIST

## 2024-10-15 RX ORDER — DILTIAZEM HYDROCHLORIDE 120 MG/1
120 CAPSULE, COATED, EXTENDED RELEASE ORAL DAILY
Status: DISCONTINUED | OUTPATIENT
Start: 2024-10-15 | End: 2024-10-15 | Stop reason: HOSPADM

## 2024-10-15 RX ORDER — DILTIAZEM HYDROCHLORIDE 120 MG/1
120 CAPSULE, COATED, EXTENDED RELEASE ORAL DAILY
Qty: 30 CAPSULE | Refills: 0 | Status: SHIPPED | OUTPATIENT
Start: 2024-10-16 | End: 2024-10-15

## 2024-10-15 RX ORDER — DILTIAZEM HYDROCHLORIDE 120 MG/1
120 CAPSULE, COATED, EXTENDED RELEASE ORAL DAILY
Qty: 30 CAPSULE | Refills: 0 | Status: SHIPPED | OUTPATIENT
Start: 2024-10-16 | End: 2024-10-23 | Stop reason: DRUGHIGH

## 2024-10-15 RX ORDER — DILTIAZEM HYDROCHLORIDE 120 MG/1
120 CAPSULE, COATED, EXTENDED RELEASE ORAL DAILY
Qty: 30 CAPSULE | Refills: 1 | Status: SHIPPED | OUTPATIENT
Start: 2024-10-15 | End: 2024-10-15

## 2024-10-15 RX ADMIN — DILTIAZEM HYDROCHLORIDE 30 MG: 30 TABLET, FILM COATED ORAL at 06:14

## 2024-10-15 RX ADMIN — DILTIAZEM HYDROCHLORIDE 120 MG: 120 CAPSULE, COATED, EXTENDED RELEASE ORAL at 11:16

## 2024-10-15 RX ADMIN — FOLIC ACID 1 MG: 1 TABLET ORAL at 09:27

## 2024-10-15 RX ADMIN — DOCUSATE SODIUM 100 MG: 100 CAPSULE, LIQUID FILLED ORAL at 09:27

## 2024-10-15 RX ADMIN — THIAMINE HCL TAB 100 MG 100 MG: 100 TAB at 09:27

## 2024-10-15 RX ADMIN — DILTIAZEM HYDROCHLORIDE 30 MG: 30 TABLET, FILM COATED ORAL at 00:18

## 2024-10-15 RX ADMIN — INSULIN LISPRO 1 UNITS: 100 INJECTION, SOLUTION INTRAVENOUS; SUBCUTANEOUS at 07:49

## 2024-10-15 RX ADMIN — NICOTINE 1 PATCH: 21 PATCH, EXTENDED RELEASE TRANSDERMAL at 09:28

## 2024-10-15 RX ADMIN — LISINOPRIL 10 MG: 10 TABLET ORAL at 09:27

## 2024-10-15 RX ADMIN — APIXABAN 5 MG: 5 TABLET, FILM COATED ORAL at 11:39

## 2024-10-15 RX ADMIN — ENOXAPARIN SODIUM 40 MG: 40 INJECTION SUBCUTANEOUS at 09:27

## 2024-10-15 RX ADMIN — Medication 1 TABLET: at 09:27

## 2024-10-15 NOTE — NURSING NOTE
Discharge instructions and medications reviewed with patient at bedside prior to discharge home. Printed handout education given on newly prescribed medications.

## 2024-10-15 NOTE — PLAN OF CARE
Problem: NEUROSENSORY - ADULT  Goal: Achieves stable or improved neurological status  Description: INTERVENTIONS  - Monitor and report changes in neurological status  - Monitor vital signs such as temperature, blood pressure, glucose, and any other labs ordered   - Initiate measures to prevent increased intracranial pressure  - Monitor for seizure activity and implement precautions if appropriate      Outcome: Progressing     Problem: NEUROSENSORY - ADULT  Goal: Remains free of injury related to seizures activity  Description: INTERVENTIONS  - Maintain airway, patient safety  and administer oxygen as ordered  - Monitor patient for seizure activity, document and report duration and description of seizure to physician/advanced practitioner  - If seizure occurs,  ensure patient safety during seizure  - Reorient patient post seizure  - Seizure pads on all 4 side rails  - Instruct patient/family to notify RN of any seizure activity including if an aura is experienced  - Instruct patient/family to call for assistance with activity based on nursing assessment  - Administer anti-seizure medications if ordered    Outcome: Progressing     Problem: NEUROSENSORY - ADULT  Goal: Achieves maximal functionality and self care  Description: INTERVENTIONS  - Monitor swallowing and airway patency with patient fatigue and changes in neurological status  - Encourage and assist patient to increase activity and self care.   - Encourage visually impaired, hearing impaired and aphasic patients to use assistive/communication devices  Outcome: Progressing     Problem: CARDIOVASCULAR - ADULT  Goal: Maintains optimal cardiac output and hemodynamic stability  Description: INTERVENTIONS:  - Monitor I/O, vital signs and rhythm  - Monitor for S/S and trends of decreased cardiac output  - Administer and titrate ordered vasoactive medications to optimize hemodynamic stability  - Assess quality of pulses, skin color and temperature  - Assess for  signs of decreased coronary artery perfusion  - Instruct patient to report change in severity of symptoms  Outcome: Progressing     Problem: GASTROINTESTINAL - ADULT  Goal: Maintains or returns to baseline bowel function  Description: INTERVENTIONS:  - Assess bowel function  - Encourage oral fluids to ensure adequate hydration  - Administer IV fluids if ordered to ensure adequate hydration  - Administer ordered medications as needed  - Encourage mobilization and activity  - Consider nutritional services referral to assist patient with adequate nutrition and appropriate food choices  Outcome: Progressing     Problem: GASTROINTESTINAL - ADULT  Goal: Maintains adequate nutritional intake  Description: INTERVENTIONS:  - Monitor percentage of each meal consumed  - Identify factors contributing to decreased intake, treat as appropriate  - Assist with meals as needed  - Monitor I&O, weight, and lab values if indicated  - Obtain nutrition services referral as needed  Outcome: Progressing

## 2024-10-15 NOTE — ASSESSMENT & PLAN NOTE
Lab Results   Component Value Date    HGBA1C 7.2 (H) 10/14/2024       Recent Labs     10/14/24  1737 10/14/24  2115 10/15/24  0716 10/15/24  1113   POCGLU 235* 147* 161* 147*       Blood Sugar Average: Last 72 hrs:  (P) 164  Patient reports that he will continue diet control for his diabetes, he will follow-up in the outpatient setting with his PCP  Diabetic neuropathy-patient used to be on gabapentin, Lyrica, Cymbalta,-patient is not interested in restarting any of these medications.

## 2024-10-15 NOTE — ASSESSMENT & PLAN NOTE
Patient now is very well rate control  IV Cardizem drip was discontinued earlier today  Status post a cardiology evaluation  Cardiogram-EF of 60%, no regional wall motion abnormalities, no significant valvular abnormalities - see the full report for additional details  Cleared by cardiology for discharge  DC home on diltiazem 120 mg p.o. daily for rate control  DC home on Eliquis 5 mg p.o. twice daily for anticoagulation purposes  Outpatient follow-up with PCP, and cardiology  No other changes to any of his preadmission medications, and/or to the preadmission doses

## 2024-10-15 NOTE — CONSULTS
Consultation - Cardiology   Name: Oscar Hardin 59 y.o. male I MRN: [id number]  Unit/Bed#: ICU 06-01 I Date of Admission: 10/14/2024   Date of Service: 10/15/2024 I Hospital Day: 0   Inpatient consult to Cardiology  Consult performed by: HARISH Navarro  Consult ordered by: Lety Gandara MD        Physician Requesting Evaluation: Lety Gandara MD   Reason for Evaluation / Principal Problem: Afib    Assessment & Plan  Atrial fibrillation with rapid ventricular response (HCC)  Rate now controlled  Continue Cardizem 120 mg daily  Eliquis 5 mg bid  Hypertension  Fairly well controlled  Continue to monitor on current regimen  Tobacco use  Cessation advised  Alcohol use  Cessation advised    Other summary comments:   Newly discovered afib s/p Cardizem gtt.  Rate now controlled on oral regimen.    Can transition to once daily dosing with 120 mg daily.  Continue Eliquis 5 mg bid at least for the short term.  Discussed the possibility of cardioversion with pt after 1 month of AC.    No significant labs abnormalities noted, echo unremarkable.    Stable for discharge from a cardiac perspective.  Will make arrangements for f/u in the outpatient setting.    Outpatient Cardiologist: none    HPI: Oscar Hardin is a 59 y.o. year old male who presented from his PCP's office with palpitations, found to be in afib with RVR.    Oscar has been experiencing palpitations for several months, however, due to a grief response from recently losing his daughter, he did not think much of it.      He was seen by his PCP on the day of admission and found to be in afib with RVR.    Oscar currently denies symptoms.  He denies any chest discomfort, shortness of breath.    No known cardiac history.        EKG: Afib, RBBB, LAFB      MOST  RECENT CARDIAC IMAGING:   Echo 10/15/2024  EF 60%  No significant valvular abnormalities    Review of Systems: a 10 point review of systems was conducted and is negative except for  "as mentioned in the HPI or as below.        Historical Information   Past Medical History:   Diagnosis Date    DMII (diabetes mellitus, type 2) (HCC) 2021    Hypercholesteremia 2021    Hypertension 2021    IBS (irritable bowel syndrome)     Constipation predominant    Lyme disease     Migraine 2017    Osteoarthritis     Post concussive encephalopathy 2017    RSD (reflex sympathetic dystrophy) 2021     Past Surgical History:   Procedure Laterality Date    CHOLECYSTECTOMY       Social History     Substance and Sexual Activity   Alcohol Use Yes    Alcohol/week: 7.0 standard drinks of alcohol    Types: 7 Standard drinks or equivalent per week    Comment: daily     Social History     Substance and Sexual Activity   Drug Use Never     Social History     Tobacco Use   Smoking Status Every Day    Current packs/day: 1.00    Average packs/day: 1 pack/day for 25.0 years (25.0 ttl pk-yrs)    Types: Cigarettes   Smokeless Tobacco Never       Family History: no known cardiac history      Meds/Allergies   all current active meds have been reviewed    Medications Prior to Admission:     ASHWAGANDHA PO    BIOTIN PO    cyclobenzaprine (FLEXERIL) 10 mg tablet    linaCLOtide (Linzess) 290 MCG CAPS    lisinopril (ZESTRIL) 10 mg tablet    MILK THISTLE PO    MULTIPLE VITAMIN PO    Niacin (VITAMIN B-3 PO)    RESVERATROL PO    Sod Picosulfate-Mag Ox-Cit Acd (Clenpiq) 10-3.5-12 MG-GM -GM/160ML SOLN    tadalafil (CIALIS) 5 MG tablet    TURMERIC PO    No Known Allergies    Objective   Vitals: Blood pressure 125/87, pulse 92, temperature 97.8 °F (36.6 °C), temperature source Temporal, resp. rate 16, height 5' 10\" (1.778 m), weight 88.5 kg (195 lb), SpO2 95%., Body mass index is 27.98 kg/m².,   Orthostatic Blood Pressures      Flowsheet Row Most Recent Value   Blood Pressure 125/87 filed at 10/15/2024 0936   Patient Position - Orthostatic VS Lying filed at 10/15/2024 0717            Systolic (24hrs), Av , " Min:125 , Max:176     Diastolic (24hrs), Av, Min:76, Max:110      Physical Exam:    General:  Normal appearance in no distress.  Eyes:  Anicteric.  Oral mucosa:  Moist.  Neck:  No JVD. Carotid upstrokes are brisk without bruits.  No masses.  Chest:  Clear to auscultation.  Cardiac:  irregularly irregular.  No palpable PMI.  Normal S1 and S2.  No murmur gallop or rub.  Abdomen:  Soft and nontender. No palpable organomegaly or aortic enlargement.  Extremities:  No peripheral edema.  Musculoskeletal:  Symmetric.   Vascular:  Pedal pulses are intact.  Neuro:  Grossly symmetric.  Psych:  Alert and oriented x3.        Lab Results:   Labs reviewed and prominent abnormalities reviewed above and/or below.    Troponins:    Results from last 7 days   Lab Units 10/14/24  1806 10/14/24  1615 10/14/24  1423   HS TNI 0HR ng/L  --   --  8   HS TNI 2HR ng/L  --  7  --    HSTNI D2 ng/L  --  -1  --    HS TNI 4HR ng/L 8  --   --    HSTNI D4 ng/L 0  --   --      BNP:   Results from last 6 Months   Lab Units 10/14/24  1423   BNP pg/mL 199*

## 2024-10-15 NOTE — ASSESSMENT & PLAN NOTE
Patient reports daily alcohol use, and he reports that his alcohol use has gotten worse since he unfortunately lost his daughter earlier this summer  No evidence of Dts  Cessation counseling provided

## 2024-10-15 NOTE — CASE MANAGEMENT
Case Management Progress Note    Patient name Oscar Hardin  Location ICU 06/ICU 06- MRN [id number]  : 1965 Date 10/15/2024       LOS (days): 0  Geometric Mean LOS (GMLOS) (days):   Days to GMLOS:        OBJECTIVE:        Current admission status: Observation  Preferred Pharmacy:   [id number] - [place], [place] - [address]  [address] [facility] [address]  Phone: [phone] Fax: [phone]    [id number] - [place], [place] - [address]  [facility]  [facility] [address]  Phone: [phone] Fax: [phone]    [id number] - [facility] [address] [facility]  [address] [facility] [address]  Phone: [phone] Fax: [phone]    Primary Care Provider: Khadar Nolen MD    [facility]  Secondary Insurance:     PROGRESS NOTE:\  Pt was admitted to the hospital for AFIB.  Had a cardiology consult and was cleared for DC.  Pt will go home on Eliquis.  Pt wants the script to  go to [facility] in [place].  Notified [facility] of same.  Provided the pt with a free coupon.  Will call the pharmacy for a price check when ordered to [facility].

## 2024-10-15 NOTE — DISCHARGE INSTR - AVS FIRST PAGE
Dear Oscar Hardin,     It was our pleasure to care for you here at [facility].  It is our hope that we were always able to exceed the expected standards for your care during your stay.  You were hospitalized due to atrial fibrillation.  You were cared for on the medical/surgical floor by Lety Gandara MD with the [facility] who covers for your primary care physician (PCP), Khadar Nolen MD, while you were hospitalized.  You were additionally seen by the [facility]-Dr. Darrel Angelo.  If you have any questions or concerns related to this hospitalization, you may contact us at .  For follow up as well as any medication refills, we recommend that you follow up with your primary care physician.  A registered nurse will reach out to you by phone within a few days after your discharge to answer any additional questions that you may have after going home.  However, at this time we provide for you here, the most important instructions / recommendations at discharge:     Notable Medication Adjustments -   New prescription Cardizem 120 mg take 1 tablet daily by mouth  New prescription Eliquis 5 mg take 1 tablet twice daily by mouth  Okay to continue all other preadmit meds at preadmit doses  Testing Required after Discharge -   To be further determined in the near future in the outpatient setting by your PCP  Important follow up information -   Please follow-up with the providers as outlined in this discharge package  Other Instructions -   Please maintain a healthy low-sodium diet  Please refrain from alcohol and tobacco products to the best of your ability  Please review this entire after visit summary as additional general instructions including medication list, appointments, activity, diet, any pertinent wound care, and other additional recommendations from your care team that may be provided for  you.      Sincerely,     Lety Gandara MD

## 2024-10-15 NOTE — DISCHARGE SUMMARY
Discharge Summary - Hospitalist   Name: Oscar Hardin 59 y.o. male I MRN: [id number]  Unit/Bed#: ICU 06-01 I Date of Admission: 10/14/2024   Date of Service: 10/15/2024 I Hospital Day: 0     Assessment & Plan  Atrial fibrillation with rapid ventricular response (HCC)  Patient now is very well rate control  IV Cardizem drip was discontinued earlier today  Status post a cardiology evaluation  Cardiogram-EF of 60%, no regional wall motion abnormalities, no significant valvular abnormalities - see the full report for additional details  Cleared by cardiology for discharge  DC home on diltiazem 120 mg p.o. daily for rate control  DC home on Eliquis 5 mg p.o. twice daily for anticoagulation purposes  Outpatient follow-up with PCP, and cardiology  No other changes to any of his preadmission medications, and/or to the preadmission doses  DMII (diabetes mellitus, type 2) ([facility])  Lab Results   Component Value Date    HGBA1C 7.2 (H) 10/14/2024       Recent Labs     10/14/24  1737 10/14/24  2115 10/15/24  0716 10/15/24  1113   POCGLU 235* 147* 161* 147*       Blood Sugar Average: Last 72 hrs:  (P) 164  Patient reports that he will continue diet control for his diabetes, he will follow-up in the outpatient setting with his PCP  Diabetic neuropathy-patient used to be on gabapentin, Lyrica, Cymbalta,-patient is not interested in restarting any of these medications.  Hypertension  Periods of accelerated hypertension noted  DC home on lisinopril, and Cardizem as outlined above  Alcohol use  Patient reports daily alcohol use, and he reports that his alcohol use has gotten worse since he unfortunately lost his daughter earlier this summer  No evidence of Dts  Cessation counseling provided  Tobacco use  Cessation counseling provided  Status posttreatment with a nicotine patch while in house     Medical Problems       Resolved Problems  Date Reviewed: 9/13/2022   None       Discharging Physician / Practitioner: Lety Gandara,  MD  PCP: Khadar Nolen MD  Admission Date:   Admission Orders (From admission, onward)       Ordered        10/14/24 1542  Place in Observation  Once                          Discharge Date: 10/15/24    Consultations During Hospital Stay:  Cardiology    Procedures Performed:   None    Significant Findings / Test Results:   Chest x-ray-no acute cardiopulmonary disease  2D echocardiogram-EF of 60%, see the full report for additional details    Incidental Findings:   None    Test Results Pending at Discharge (will require follow up):   None     Outpatient Tests Requested:  None    Complications: None    Reason for Admission: A-fib with rapid ventricular response    Hospital Course:   Oscar Hardin is a 59 y.o. male patient who originally presented to the hospital on 10/14/2024 due to being sent in by his PCP.  Please refer to my initial history and physical examination from yesterday for the initial presenting features and complaints.  In brief, the patient was treated for A-fib with RVR.  He was treated with an IV Cardizem drip.  Cardizem drip was discontinued on the morning of 10/15/2024 after he became very well rate controlled.  He was seen by cardiology.  Echocardiogram was performed.  Echo was grossly within normal limits.  He was cleared by cardiology for discharge home on diltiazem 120 mg daily p.o., and Eliquis 5 mg p.o. twice daily for anticoagulation purposes.  Cardiology will be following up in the near future in the outpatient setting.  No other changes were made to any of his preadmission medications, and/or to the preadmission doses.  Please refer to the assessment/plan portion of this discharge summary as outlined above for additional details regarding his stay.      Please see above list of diagnoses and related plan for additional information.     Condition at Discharge: good    Discharge Day Visit / Exam:   Subjective: Patient seen, looks and feels well, is eager and wanting to go home  Vitals:  "Blood Pressure: (!) 161/107 (10/15/24 1117)  Pulse: 92 (10/15/24 1000)  Temperature: 98.4 °F (36.9 °C) (10/15/24 1116)  Temp Source: Temporal (10/15/24 1116)  Respirations: 16 (10/15/24 1000)  Height: 5' 10\" (177.8 cm) (10/15/24 0936)  Weight - Scale: 88.5 kg (195 lb) (10/15/24 0936)  SpO2: 95 % (10/15/24 1000)  Physical Exam  Vitals and nursing note reviewed.   Constitutional:       General: He is not in acute distress.     Appearance: Normal appearance. He is not ill-appearing.   HENT:      Head: Normocephalic and atraumatic.      Nose: Nose normal.   Eyes:      Extraocular Movements: Extraocular movements intact.      Pupils: Pupils are equal, round, and reactive to light.   Cardiovascular:      Rate and Rhythm: Normal rate and regular rhythm.      Pulses: Normal pulses.      Heart sounds: Normal heart sounds. No murmur heard.     No friction rub. No gallop.   Pulmonary:      Effort: Pulmonary effort is normal.      Breath sounds: Normal breath sounds.   Abdominal:      General: There is no distension.      Palpations: Abdomen is soft. There is no mass.      Tenderness: There is no abdominal tenderness. There is no guarding or rebound.   Musculoskeletal:         General: No swelling or tenderness. Normal range of motion.      Cervical back: Normal range of motion and neck supple. No rigidity. No muscular tenderness.      Right lower leg: No edema.      Left lower leg: No edema.   Skin:     General: Skin is warm.      Capillary Refill: Capillary refill takes less than 2 seconds.      Findings: No erythema or rash.   Neurological:      General: No focal deficit present.      Mental Status: He is alert and oriented to person, place, and time. Mental status is at baseline.   Psychiatric:         Mood and Affect: Mood normal.         Behavior: Behavior normal.          Discussion with Family: Patient declined call to .     Discharge instructions/Information to patient and family:   See after visit " summary for information provided to patient and family.      Provisions for Follow-Up Care:  See after visit summary for information related to follow-up care and any pertinent home health orders.      Mobility at time of Discharge:   Basic Mobility Inpatient Raw Score: 24  JH-HLM Goal: 8: Walk 250 feet or more  JH-HLM Achieved: 7: Walk 25 feet or more  HLM Goal achieved. Continue to encourage appropriate mobility.     Disposition:   Home    Planned Readmission: None    Discharge Medications:  See after visit summary for reconciled discharge medications provided to patient and/or family.      Administrative Statements   Discharge Statement:  I have spent a total time of 40 minutes in caring for this patient on the day of the visit/encounter. >30 minutes of time was spent on: Diagnostic results, Prognosis, Risks and benefits of tx options, Instructions for management, Patient and family education, Importance of tx compliance, Risk factor reductions, Impressions, Counseling / Coordination of care, Documenting in the medical record, Reviewing / ordering tests, medicine, procedures  , and Communicating with other healthcare professionals .    **Please Note: This note may have been constructed using a voice recognition system**

## 2024-10-15 NOTE — UTILIZATION REVIEW
"Initial Clinical Review    Admission: Date/Time/Statement:   Admission Orders (From admission, onward)       Ordered        10/14/24 1542  Place in Observation  Once                          Orders Placed This Encounter   Procedures    Place in Observation     Standing Status:   Standing     Number of Occurrences:   1     Order Specific Question:   Level of Care     Answer:   Level 2 Stepdown / HOT [14]     ED Arrival Information       Expected   -    Arrival   10/14/2024 13:55    Acuity   Emergent              Means of arrival   Walk-In    Escorted by   Spouse    Service   Hospitalist    Admission type   Emergency              Arrival complaint   high heart rate, a fib             Chief Complaint   Patient presents with    Rapid Heart Rate     Sent in by family doctor for evaluation of  a-fib       Initial Presentation: 59 y.o. male \"I was sent in by my PCP because of ongoing intermittent palpitations that started approximately 3 months ago\"     Oscar Hardin is a 59 y.o. male with a PMH of the medical conditions as outlined below who presents with the chief complaint as outlined above.     Patient states that he was doing okay up until a few months ago.  Approximately 3 months ago he started experiencing intermittent episodes of palpitations.  He did not think much of what was going on was because unfortunately he recently lost his adult daughter, and has been experiencing a lot of grief.     He denies any chest pain, nausea, vomiting, diarrhea, fevers, chills, numbness, and/or weakness.  He endorses some numbness and tingling of his upper extremities which he attributes to his known history of having neuropathy.     He went to his PCPs office today for an annual evaluation, and in the PCPs office he was found to be in a state of atrial fibrillation with rapid ventricular response and was then sent over to the emergency room for further evaluation.     In the emergency room, he was indeed found to be in a state " of A-fib with RVR, was started on IV Cardizem drip, and then was subsequently admitted to the hospital.    ADMIT OBSERVATION STATUS    Anticipated Length of Stay/Certification Statement:      Date:     Day 2: Date:    Day 3: Has surpassed a 2nd midnight with active treatments and services.        ED Treatment-Medication Administration from 10/14/2024 1355 to 10/14/2024 1639         Date/Time Order Dose Route Action     10/14/2024 1424 diltiazem (CARDIZEM) injection 10 mg 10 mg Intravenous Given     10/14/2024 1448 diltiazem (CARDIZEM) 125 mg in sodium chloride 0.9 % 125 mL infusion 1 mg/hr Intravenous New Bag     10/14/2024 1630 diltiazem (CARDIZEM) 125 mg in sodium chloride 0.9 % 125 mL infusion 2.5 mg/hr Intravenous Rate/Dose Change            Scheduled Medications:  diltiazem, 30 mg, Oral, Q6H BRUNILDA  docusate sodium, 100 mg, Oral, BID  enoxaparin, 40 mg, Subcutaneous, Daily  folic acid, 1 mg, Oral, Daily  insulin lispro, 1-6 Units, Subcutaneous, TID AC  insulin lispro, 1-6 Units, Subcutaneous, HS  lisinopril, 10 mg, Oral, Daily  multivitamin-minerals, 1 tablet, Oral, Daily  nicotine, 1 patch, Transdermal, Daily  thiamine, 100 mg, Oral, Daily      Continuous IV Infusions:  diltiazem, 1-15 mg/hr, Intravenous, Titrated      PRN Meds:  acetaminophen, 650 mg, Oral, Q6H PRN  hydrALAZINE, 20 mg, Intravenous, Q6H PRN  ondansetron, 4 mg, Intravenous, Q6H PRN      ED Triage Vitals [10/14/24 1402]   Temperature Pulse Respirations Blood Pressure SpO2 Pain Score   97.9 °F (36.6 °C) (!) 125 20 (!) 175/110 99 % No Pain     Weight (last 2 days)       Date/Time Weight    10/15/24 0600 88.5 (195.11)    10/14/24 1642 88.5 (195.11)            Vital Signs (last 3 days)       Date/Time Temp Pulse Resp BP MAP (mmHg) SpO2 O2 Device Patient Position - Orthostatic VS Rodolfo Coma Scale Score CIWA-Ar Total Pain    10/15/24 0717 97.8 °F (36.6 °C) -- -- 125/87 101 -- None (Room air) Lying -- -- --    10/15/24 0614 -- -- -- 133/83 -- -- -- --  -- -- --    10/15/24 0510 -- 79 -- -- -- -- -- -- -- -- --    10/15/24 0400 97.3 °F (36.3 °C) 76 15 128/85 100 96 % None (Room air) Lying -- 0 No Pain    10/15/24 0019 -- 75 -- -- -- -- -- -- -- -- --    10/15/24 0018 -- -- -- 131/76 -- -- -- -- -- -- --    10/15/24 0002 -- 76 -- -- -- -- -- -- -- -- --    10/15/24 0000 97.5 °F (36.4 °C) 73 19 131/76 99 95 % None (Room air) Lying -- 0 No Pain    10/14/24 2301 -- 80 -- -- -- -- -- -- -- -- --    10/14/24 2217 -- 78 -- -- -- -- -- -- -- -- --    10/14/24 2000 97.5 °F (36.4 °C) 110 22 150/90 110 96 % None (Room air) Lying 15 -- No Pain    10/14/24 1900 -- 83 17 148/83 108 96 % None (Room air) -- -- -- --    10/14/24 1800 -- 100 17 145/98 118 98 % -- -- -- -- --    10/14/24 1729 -- 121 43 135/100 113 96 % -- -- -- -- No Pain    10/14/24 1719 -- 113 23 151/99 119 98 % -- -- -- -- --    10/14/24 1711 -- 124 25 -- -- 98 % -- -- -- 0 --    10/14/24 1642 97.2 °F (36.2 °C) 116 26 176/103 134 -- -- -- -- -- --    10/14/24 1600 -- 117 23 172/91 125 98 % -- -- -- -- --    10/14/24 1448 -- 110 -- -- -- -- -- -- -- -- --    10/14/24 1402 97.9 °F (36.6 °C) 125 20 175/110 -- 99 % None (Room air) Lying -- -- No Pain           CIWA-Ar Score       Row Name 10/15/24 0400 10/15/24 0000 10/14/24 1711       CIWA-Ar    Nausea and Vomiting 0 0 0    Tactile Disturbances 0 0 0    Tremor 0 0 0    Auditory Disturbances 0 0 0    Paroxysmal Sweats 0 0 0    Visual Disturbances 0 0 0    Anxiety 0 0 0    Headache, Fullness in Head 0 0 0    Agitation 0 0 0    Orientation and Clouding of Sensorium 0 0 0    CIWA-Ar Total 0 0 0                    Pertinent Labs/Diagnostic Test Results:   Radiology:  XR chest 1 view portable   Final Interpretation by Mary Carmen Howard MD (10/15 0547)      No acute cardiopulmonary disease.            Workstation performed: [id number]           Cardiology:  No orders to display     GI:  No orders to display           Results from last 7 days   Lab Units 10/14/24  7411  "  WBC Thousand/uL 7.21   HEMOGLOBIN g/dL 14.6   HEMATOCRIT % 43.6   PLATELETS Thousands/uL 211   TOTAL NEUT ABS Thousands/µL 5.10         Results from last 7 days   Lab Units 10/15/24  0503 10/14/24  1423   SODIUM mmol/L 140 140   POTASSIUM mmol/L 3.9 4.3   CHLORIDE mmol/L 109* 104   CO2 mmol/L 27 28   ANION GAP mmol/L 4 8   BUN mg/dL 12 11   CREATININE mg/dL 0.80 0.89   EGFR ml/min/1.73sq m 97 93   CALCIUM mg/dL 8.8 9.4   MAGNESIUM mg/dL 2.2  --    PHOSPHORUS mg/dL 2.8  --      Results from last 7 days   Lab Units 10/15/24  0503 10/14/24  1423   AST U/L 14 14   ALT U/L 24 27   ALK PHOS U/L 60 64   TOTAL PROTEIN g/dL 6.2* 6.9   ALBUMIN g/dL 3.9 4.3   TOTAL BILIRUBIN mg/dL 0.46 0.65     Results from last 7 days   Lab Units 10/15/24  0716 10/14/24  2115 10/14/24  1737 10/14/24  1418   POC GLUCOSE mg/dl 161* 147* 235* 130     Results from last 7 days   Lab Units 10/15/24  0503 10/14/24  1423   GLUCOSE RANDOM mg/dL 152* 143*         Results from last 7 days   Lab Units 10/14/24  1423   HEMOGLOBIN A1C % 7.2*   EAG mg/dl 160     No results found for: \"BETA-HYDROXYBUTYRATE\"                   Results from last 7 days   Lab Units 10/14/24  1806 10/14/24  1615 10/14/24  1423   HS TNI 0HR ng/L  --   --  8   HS TNI 2HR ng/L  --  7  --    HSTNI D2 ng/L  --  -1  --    HS TNI 4HR ng/L 8  --   --    HSTNI D4 ng/L 0  --   --              Results from last 7 days   Lab Units 10/14/24  1423   TSH 3RD GENERATON uIU/mL 0.848                     Results from last 7 days   Lab Units 10/14/24  1423   BNP pg/mL 199*                                                                                       Past Medical History:   Diagnosis Date    DMII (diabetes mellitus, type 2) ([facility]) 02/06/2021    Hypercholesteremia 02/06/2021    Hypertension 02/06/2021    IBS (irritable bowel syndrome)     Constipation predominant    Lyme disease     Migraine 09/13/2017    Osteoarthritis     Post concussive encephalopathy 09/29/2017    RSD (reflex sympathetic " dystrophy) 02/06/2021     Present on Admission:   DMII (diabetes mellitus, type 2) (HCC)   Hypertension      Admitting Diagnosis: Rapid heart rate [R00.0]  New onset a-fib (HCC) [I48.91]  Atrial fibrillation with rapid ventricular response (HCC) [I48.91]  Age/Sex: 59 y.o. male    Network Utilization Review Department  ATTENTION: Please call with any questions or concerns to [phone] and carefully listen to the prompts so that you are directed to the right person. All voicemails are confidential.   For Discharge needs, contact Care Management DC Support Team at [phone] opt. 2  Send all requests for admission clinical reviews, approved or denied determinations and any other requests to dedicated fax number below belonging to the campus where the patient is receiving treatment. List of dedicated fax numbers for the Facilities:  FACILITY NAME UR FAX NUMBER   ADMISSION DENIALS (Administrative/Medical Necessity) [phone]   DISCHARGE SUPPORT TEAM (NETWORK) [phone]   PARENT CHILD HEALTH (Maternity/NICU/Pediatrics) [phone]   [facility] [phone]   [facility] [phone]   [facility] [phone]   [facility] [phone]   [facility] [phone]   [facility] [phone]   [facility] [phone]   [facility] [phone]   [facility] [phone]   [facility] [phone]   [facility] [phone]   [facility] [phone]

## 2024-10-16 LAB
ATRIAL RATE: 110 BPM
QRS AXIS: -56 DEGREES
QRSD INTERVAL: 138 MS
QT INTERVAL: 368 MS
QTC INTERVAL: 513 MS
T WAVE AXIS: 17 DEGREES
VENTRICULAR RATE: 117 BPM

## 2024-10-16 PROCEDURE — 93010 ELECTROCARDIOGRAM REPORT: CPT | Performed by: INTERNAL MEDICINE

## 2024-10-17 ENCOUNTER — TELEPHONE (OUTPATIENT)
Age: 59
End: 2024-10-17

## 2024-10-17 NOTE — TELEPHONE ENCOUNTER
"Hello,    The following message was sent via e-mail to the leadership team:     Please advise if you can help facilitate the following overbook request:    Patient Name: Oscar Hardin    Patient MRN: [id number]    Call back #: [phone]    Insurance: [facility] insurance    Department:Cardiology    Speciality: General Cardiology    Reason for overbook request: OTHER (PLEASE WRITE REASON IN COMMENT SECTION)    Comments (Write \"N/a\" if no comments): Patient called for a hospital follow up appointment with specifically Dr. Angelo. Patient was consulted by Dr. Angelo in the hospital and patient was advised to schedule an appointment within 10-14 days (also in discharge notes). Dr. Angelo has no availability until January 2nd for a hospital follow up appointment. Please advise patient with this overbook request.     Requested doctor and location: Dr. Angelo/[facility]    Date of current appointment: 1/2/25      Thank you,  Adriana Mariee  "

## 2024-10-23 ENCOUNTER — HOSPITAL ENCOUNTER (EMERGENCY)
Facility: HOSPITAL | Age: 59
Discharge: HOME | End: 2024-10-23
Attending: INTERNAL MEDICINE
Payer: COMMERCIAL

## 2024-10-23 ENCOUNTER — APPOINTMENT (EMERGENCY)
Dept: ULTRASOUND IMAGING | Facility: HOSPITAL | Age: 59
End: 2024-10-23
Payer: COMMERCIAL

## 2024-10-23 VITALS
RESPIRATION RATE: 20 BRPM | SYSTOLIC BLOOD PRESSURE: 160 MMHG | TEMPERATURE: 97.6 F | DIASTOLIC BLOOD PRESSURE: 99 MMHG | HEART RATE: 112 BPM | OXYGEN SATURATION: 97 %

## 2024-10-23 DIAGNOSIS — N45.1 EPIDIDYMITIS: Primary | ICD-10-CM

## 2024-10-23 DIAGNOSIS — N45.2 ORCHITIS: ICD-10-CM

## 2024-10-23 DIAGNOSIS — I48.91 ATRIAL FIBRILLATION (HCC): ICD-10-CM

## 2024-10-23 LAB
BILIRUB UR QL STRIP: NEGATIVE
CLARITY UR: CLEAR
COLOR UR: ABNORMAL
GLUCOSE UR STRIP-MCNC: NEGATIVE MG/DL
HGB UR QL STRIP.AUTO: NEGATIVE
KETONES UR STRIP-MCNC: NEGATIVE MG/DL
LEUKOCYTE ESTERASE UR QL STRIP: NEGATIVE
NITRITE UR QL STRIP: NEGATIVE
PH UR STRIP.AUTO: 6 [PH]
PROT UR STRIP-MCNC: NEGATIVE MG/DL
SP GR UR STRIP.AUTO: <=1.005
UROBILINOGEN UR QL STRIP.AUTO: 0.2 E.U./DL

## 2024-10-23 PROCEDURE — 99284 EMERGENCY DEPT VISIT MOD MDM: CPT

## 2024-10-23 PROCEDURE — 99284 EMERGENCY DEPT VISIT MOD MDM: CPT | Performed by: INTERNAL MEDICINE

## 2024-10-23 PROCEDURE — 81003 URINALYSIS AUTO W/O SCOPE: CPT | Performed by: INTERNAL MEDICINE

## 2024-10-23 PROCEDURE — 76870 US EXAM SCROTUM: CPT

## 2024-10-23 RX ORDER — DILTIAZEM HYDROCHLORIDE 180 MG/1
180 CAPSULE, COATED, EXTENDED RELEASE ORAL DAILY
Qty: 15 CAPSULE | Refills: 0 | Status: SHIPPED | OUTPATIENT
Start: 2024-10-23

## 2024-10-23 RX ORDER — DOXYCYCLINE 100 MG/1
100 CAPSULE ORAL 2 TIMES DAILY
Qty: 20 CAPSULE | Refills: 0 | Status: SHIPPED | OUTPATIENT
Start: 2024-10-23 | End: 2024-10-30

## 2024-10-23 RX ORDER — DOXYCYCLINE 100 MG/1
100 CAPSULE ORAL ONCE
Status: COMPLETED | OUTPATIENT
Start: 2024-10-23 | End: 2024-10-23

## 2024-10-23 RX ORDER — DILTIAZEM HYDROCHLORIDE 120 MG/1
120 CAPSULE, COATED, EXTENDED RELEASE ORAL ONCE
Status: COMPLETED | OUTPATIENT
Start: 2024-10-23 | End: 2024-10-23

## 2024-10-23 RX ADMIN — DILTIAZEM HYDROCHLORIDE 120 MG: 120 CAPSULE, COATED, EXTENDED RELEASE ORAL at 18:35

## 2024-10-23 RX ADMIN — DOXYCYCLINE HYCLATE 100 MG: 100 CAPSULE ORAL at 18:35

## 2024-10-23 NOTE — ED PROVIDER NOTES
Time reflects when diagnosis was documented in both MDM as applicable and the Disposition within this note       Time User Action Codes Description Comment    10/23/2024  6:23 PM Efrain Jeffery Add [N45.1] Epididymitis     10/23/2024  6:23 PM Efrain Jeffery Add [N45.2] Orchitis     10/23/2024  6:23 PM Efrain Jeffery Add [I48.91] Atrial fibrillation (HCC)           ED Disposition       ED Disposition   Discharge    Condition   Stable    Date/Time   Wed Oct 23, 2024  6:23 PM    Comment   Oscar Byrdricardos discharge to home/self care.                   Assessment & Plan       Medical Decision Making  59-year-old male presents with swelling and discomfort in his testicles for the last 2 days.  Was treated for atrial fibrillation started on anticoagulation about a week ago.  Has been doing well otherwise.  He just noted swelling and discomfort in his testicles.  He has no abdominal pain associated with this.  Denies any other urinary symptoms.  Pain is in the top of his testicles.  Currently denies any fever or chills, no flank pain, dysuria.    Differential diagnosis currently includes ovarian torsion, more likely epididymitis, rule out hemorrhagic hydrocele.    Amount and/or Complexity of Data Reviewed  Radiology: ordered.     Details: Ultrasound of the testicle and scrotum reveals suspicion for epididymitis and orchitis.    Risk  Prescription drug management.  Risk Details: During patient's stay, his heart rate was between 112 and 130.  His pain was not terribly severe, remained irregular.  I increased his Cardizem from 1 , but gave an additional 120 while he is in the emergency room.  Also started doxycycline 100 mg twice daily for 7 days for cheilitis/epididymitis.  Notably he remains on Eliquis for likely has some amount of associated with his inflammation.  Recommend he follow-up with urologist recommended by his PCP.  Will follow-up with his PCP in the near future.  He does have follow-up with  cardiology in roughly 7 to 10 days.  He will get a pulse ox begin to monitor his heart rate, and call in for recommendations on dose adjustment based on his heart rate.             Medications   diltiazem (CARDIZEM CD) 24 hr capsule 120 mg (120 mg Oral Given 10/23/24 1835)   doxycycline hyclate (VIBRAMYCIN) capsule 100 mg (100 mg Oral Given 10/23/24 1835)       ED Risk Strat Scores                           SBIRT 22yo+      Flowsheet Row Most Recent Value   Initial Alcohol Screen: US AUDIT-C     1. How often do you have a drink containing alcohol? 0 Filed at: 10/23/2024 1639   2. How many drinks containing alcohol do you have on a typical day you are drinking?  0 Filed at: 10/23/2024 1639   3a. Male UNDER 65: How often do you have five or more drinks on one occasion? 0 Filed at: 10/23/2024 1639   3b. FEMALE Any Age, or MALE 65+: How often do you have 4 or more drinks on one occassion? 0 Filed at: 10/23/2024 1639   Audit-C Score 0 Filed at: 10/23/2024 1639   [place]: How many times in the past year have you...    Used an illegal drug or used a prescription medication for non-medical reasons? Never Filed at: 10/23/2024 1639                            History of Present Illness       Chief Complaint   Patient presents with    Testicle Swelling     Patient comes in with swelling to the testicles that started about 4 days ago. Reporting pain as well.   Denies any injury to the area.        Past Medical History:   Diagnosis Date    DMII (diabetes mellitus, type 2) (HCC) 02/06/2021    Hypercholesteremia 02/06/2021    Hypertension 02/06/2021    IBS (irritable bowel syndrome)     Constipation predominant    Lyme disease     Migraine 09/13/2017    Osteoarthritis     Post concussive encephalopathy 09/29/2017    RSD (reflex sympathetic dystrophy) 02/06/2021      Past Surgical History:   Procedure Laterality Date    CHOLECYSTECTOMY        Family History   Problem Relation Age of Onset    Skin cancer Mother     Cervical cancer  Daughter       Social History     Tobacco Use    Smoking status: Every Day     Current packs/day: 1.00     Average packs/day: 1 pack/day for 25.0 years (25.0 ttl pk-yrs)     Types: Cigarettes    Smokeless tobacco: Never   Vaping Use    Vaping status: Never Used   Substance Use Topics    Alcohol use: Yes     Alcohol/week: 7.0 standard drinks of alcohol     Types: 7 Standard drinks or equivalent per week     Comment: daily    Drug use: Never      E-Cigarette/Vaping    E-Cigarette Use Never User       E-Cigarette/Vaping Substances      I have reviewed and agree with the history as documented.     59-year-old male presents with swelling and discomfort in his testicles for the last 2 days.  Was treated for atrial fibrillation started on anticoagulation about a week ago.  Has been doing well otherwise.  He just noted swelling and discomfort in his testicles.  He has no abdominal pain associated with this.  Denies any other urinary symptoms.  Pain is in the top of his testicles.  Currently denies any fever or chills, no flank pain, dysuria.    Differential diagnosis currently includes ovarian torsion, more likely epididymitis, rule out hemorrhagic hydrocele.        Review of Systems   Constitutional:  Negative for chills and fever.   Eyes:  Negative for pain and visual disturbance.   Respiratory:  Negative for cough and shortness of breath.    Cardiovascular:  Negative for chest pain and palpitations.   Gastrointestinal:  Negative for abdominal pain and vomiting.   Genitourinary:  Positive for scrotal swelling and testicular pain. Negative for dysuria and hematuria.   Musculoskeletal:  Negative for arthralgias and back pain.   Skin:  Negative for color change and rash.   Neurological:  Negative for seizures and syncope.   All other systems reviewed and are negative.          Objective       ED Triage Vitals [10/23/24 1639]   Temperature Pulse Blood Pressure Respirations SpO2 Patient Position - Orthostatic VS   97.6 °F  (36.4 °C) 82 156/97 16 98 % --      Temp Source Heart Rate Source BP Location FiO2 (%) Pain Score    Tympanic Monitor Left arm -- 8      Vitals      Date and Time Temp Pulse SpO2 Resp BP Pain Score FACES Pain Rating User   10/23/24 1835 -- 112 97 % 20 160/99 -- -- AM   10/23/24 1639 97.6 °F (36.4 °C) 82 98 % 16 156/97 8 --             Physical Exam  Vitals and nursing note reviewed.   Constitutional:       General: He is not in acute distress.     Appearance: He is well-developed.   HENT:      Head: Normocephalic and atraumatic.   Eyes:      Conjunctiva/sclera: Conjunctivae normal.   Cardiovascular:      Rate and Rhythm: Normal rate. Rhythm irregular.      Heart sounds: No murmur heard.  Pulmonary:      Effort: Pulmonary effort is normal. No respiratory distress.      Breath sounds: Normal breath sounds.   Abdominal:      Palpations: Abdomen is soft.      Tenderness: There is no abdominal tenderness.   Musculoskeletal:         General: No swelling.      Cervical back: Neck supple.   Skin:     General: Skin is warm and dry.      Capillary Refill: Capillary refill takes less than 2 seconds.   Neurological:      General: No focal deficit present.      Mental Status: He is alert and oriented to person, place, and time.   Psychiatric:         Mood and Affect: Mood normal.         Results Reviewed       Procedure Component Value Units Date/Time    UA w Reflex to Microscopic w Reflex to Culture [[id number]]  (Abnormal) Collected: 10/23/24 1705    Lab Status: Final result Specimen: Urine, Clean Catch Updated: 10/23/24 1711     Color, UA Straw     Clarity, UA Clear     Specific Gravity, UA <=1.005     pH, UA 6.0     Leukocytes, UA Negative     Nitrite, UA Negative     Protein, UA Negative mg/dl      Glucose, UA Negative mg/dl      Ketones, UA Negative mg/dl      Urobilinogen, UA 0.2 E.U./dl      Bilirubin, UA Negative     Occult Blood, UA Negative            US scrotum and testicles   Final Interpretation by Jero Salcedo  MD Bernardo (10/23 0672)      Bilateral epididymoorchitis.      The study was marked in EPIC for immediate notification.      Workstation performed: [id number]             Procedures    ED Medication and Procedure Management   Prior to Admission Medications   Prescriptions Last Dose Informant Patient Reported? Taking?   ASHWAGANDHA PO   Yes No   Sig: Take by mouth   BIOTIN PO   Yes No   Sig: Take by mouth   MILK THISTLE PO   Yes No   Sig: Take by mouth   MULTIPLE VITAMIN PO   Yes No   Sig: Take by mouth   Niacin (VITAMIN B-3 PO)   Yes No   Sig: Take by mouth   RESVERATROL PO   Yes No   Sig: Take by mouth   TURMERIC PO   Yes No   Sig: Take by mouth   apixaban (ELIQUIS) 5 mg   No No   Sig: Take 1 tablet (5 mg total) by mouth 2 (two) times a day   cyclobenzaprine (FLEXERIL) 10 mg tablet   Yes No   lisinopril (ZESTRIL) 10 mg tablet   Yes No   Sig: Take 10 mg by mouth daily   tadalafil (CIALIS) 5 MG tablet   Yes No   Sig: Take 5 mg by mouth in the morning      Facility-Administered Medications: None     Discharge Medication List as of 10/23/2024  6:26 PM        START taking these medications    Details   diltiazem (CARDIZEM CD) 180 mg 24 hr capsule Take 1 capsule (180 mg total) by mouth daily, Starting Wed 10/23/2024, Normal      doxycycline hyclate (VIBRAMYCIN) 100 mg capsule Take 1 capsule (100 mg total) by mouth 2 (two) times a day for 7 days, Starting Wed 10/23/2024, Until Wed 10/30/2024, Normal           CONTINUE these medications which have NOT CHANGED    Details   apixaban (ELIQUIS) 5 mg Take 1 tablet (5 mg total) by mouth 2 (two) times a day, Starting Tue 10/15/2024, Until Thu 11/14/2024, Normal      ASHWAGANDHA PO Take by mouth, Historical Med      BIOTIN PO Take by mouth, Historical Med      cyclobenzaprine (FLEXERIL) 10 mg tablet Historical Med      lisinopril (ZESTRIL) 10 mg tablet Take 10 mg by mouth daily, Historical Med      MILK THISTLE PO Take by mouth, Historical Med      MULTIPLE VITAMIN PO Take by  mouth, Historical Med      Niacin (VITAMIN B-3 PO) Take by mouth, Historical Med      RESVERATROL PO Take by mouth, Historical Med      tadalafil (CIALIS) 5 MG tablet Take 5 mg by mouth in the morning, Historical Med      TURMERIC PO Take by mouth, Historical Med           No discharge procedures on file.  ED SEPSIS DOCUMENTATION   Time reflects when diagnosis was documented in both MDM as applicable and the Disposition within this note       Time User Action Codes Description Comment    10/23/2024  6:23 PM Efrain Jeffery [N45.1] Epididymitis     10/23/2024  6:23 PM Efrain Jeffery [N45.2] Orchitis     10/23/2024  6:23 PM Efrain Jeffery [I48.91] Atrial fibrillation (HCC)                  Efrain Jeffery DO  10/23/24 0074

## 2024-11-06 ENCOUNTER — OFFICE VISIT (OUTPATIENT)
Dept: CARDIOLOGY CLINIC | Facility: CLINIC | Age: 59
End: 2024-11-06
Payer: COMMERCIAL

## 2024-11-06 VITALS
BODY MASS INDEX: 28.92 KG/M2 | SYSTOLIC BLOOD PRESSURE: 126 MMHG | HEART RATE: 100 BPM | DIASTOLIC BLOOD PRESSURE: 82 MMHG | HEIGHT: 70 IN | WEIGHT: 202 LBS

## 2024-11-06 DIAGNOSIS — I10 PRIMARY HYPERTENSION: ICD-10-CM

## 2024-11-06 DIAGNOSIS — I48.91 ATRIAL FIBRILLATION WITH RAPID VENTRICULAR RESPONSE (HCC): ICD-10-CM

## 2024-11-06 DIAGNOSIS — I48.91 ATRIAL FIBRILLATION (HCC): ICD-10-CM

## 2024-11-06 DIAGNOSIS — I48.19 PERSISTENT ATRIAL FIBRILLATION (HCC): Primary | ICD-10-CM

## 2024-11-06 PROCEDURE — 93000 ELECTROCARDIOGRAM COMPLETE: CPT | Performed by: NURSE PRACTITIONER

## 2024-11-06 PROCEDURE — 99214 OFFICE O/P EST MOD 30 MIN: CPT | Performed by: NURSE PRACTITIONER

## 2024-11-06 RX ORDER — IRBESARTAN AND HYDROCHLOROTHIAZIDE 300; 12.5 MG/1; MG/1
1 TABLET, FILM COATED ORAL DAILY
COMMUNITY
Start: 2024-10-14

## 2024-11-06 RX ORDER — TAMSULOSIN HYDROCHLORIDE 0.4 MG/1
0.4 CAPSULE ORAL
COMMUNITY
Start: 2024-10-14

## 2024-11-06 RX ORDER — DILTIAZEM HYDROCHLORIDE 240 MG/1
240 CAPSULE, COATED, EXTENDED RELEASE ORAL DAILY
Qty: 30 CAPSULE | Refills: 11 | Status: SHIPPED | OUTPATIENT
Start: 2024-11-06

## 2024-11-06 NOTE — PROGRESS NOTES
Patient ID: Oscar Hardin is a 59 y.o. male.        Plan:      Assessment & Plan  Atrial fibrillation (HCC)  Rate not adequately controlled  Increase diltiazem to 240 mg daily  Continue Eliquis 5 mg twice daily  Plan for cardioversion    Primary hypertension  Well controlled  Continue to monitor on current regimen      Follow up Plan/Summary Comments:  Increase diltiazem to 240 mg daily.  Continue Eliquis 5 mg twice daily  Return in 2 weeks for BP check and EKG.  Plan for cardioversion after 1 month of anticoagulation    HPI: Oscar is seen in the office today for a hospital follow up visit    He was hospitalized at [facility] 10/14/2024 after presenting from his PCP's office with newly discovered A-fib.    Oscar had been experiencing palpitations for several months, but really did not give it much thought.  He was evaluated by his PCP and found to be in A-fib with RVR.    During the hospitalization, he was maintained on a Cardizem drip for short time and transitioned to once daily dosing.  Anticoagulation was initiated with Eliquis.    He had an ER encounter approximately 2 days after his discharge for epididymitis.  At that time, diltiazem was increased from 120 mg to 180 mg for better rate control.    Oscar has been feeling fatigued and lack of energy.  He feels his heartbeat is fast at night when he lays down in bed.  He occasionally has some tightness in the chest when heart rate is elevated.    He denies any shortness of breath, dizziness, lightness, syncope.  He has been compliant with Eliquis and denies any bleeding problems.    Review of Systems   10  point ROS  was otherwise non pertinent or negative except as per HPI or as below.   Gait: Normal      Most recent or relevant cardiac/vascular testing:      Results for orders placed or performed in visit on 11/06/24   POCT ECG    Impression    A-fib, left axis deviation, right bundle branch block     Echo 10/15/2024  EF 60%  Mild concentric  "LVH  No significant valvular disease noted      Objective:     /82   Pulse 100   Ht 5' 10\" (1.778 m)   Wt 91.6 kg (202 lb)   BMI 28.98 kg/m²     PHYSICAL EXAM:    General:  Normal appearance, no acute distress  Eyes:  Anicteric.  Oral mucosa:  Moist.  Neck:  No JVD. Carotid upstrokes are brisk without bruits.  No masses.  Chest:  Clear to auscultation   Cardiac: Irregularly irregular, borderline tachycardic.  No palpable PMI.  Normal S1 and S2.  No murmur gallop or rub.  Abdomen:  Soft and nontender. No palpable organomegaly or aortic enlargement.  Extremities:  No peripheral edema.  Musculoskeletal:  Symmetric.   Vascular: Pedal pulses are intact.  Neuro:  Grossly symmetric.  Psych:  Alert and oriented x3.    No Known Allergies    Current Outpatient Medications:     apixaban (ELIQUIS) 5 mg, Take 1 tablet (5 mg total) by mouth 2 (two) times a day, Disp: 60 tablet, Rfl: 0    ASHWAGANDHA PO, Take by mouth, Disp: , Rfl:     BIOTIN PO, Take by mouth, Disp: , Rfl:     cyclobenzaprine (FLEXERIL) 10 mg tablet, Take 10 mg by mouth as needed, Disp: , Rfl:     diltiazem (CARDIZEM CD) 240 mg 24 hr capsule, Take 1 capsule (240 mg total) by mouth daily, Disp: 30 capsule, Rfl: 11    irbesartan-hydrochlorothiazide (AVALIDE) 300-12.5 MG per tablet, Take 1 tablet by mouth daily, Disp: , Rfl:     MILK THISTLE PO, Take by mouth, Disp: , Rfl:     MULTIPLE VITAMIN PO, Take by mouth, Disp: , Rfl:     Niacin (VITAMIN B-3 PO), Take by mouth, Disp: , Rfl:     RESVERATROL PO, Take by mouth, Disp: , Rfl:     tadalafil (CIALIS) 5 MG tablet, Take 5 mg by mouth in the morning, Disp: , Rfl:     tamsulosin (FLOMAX) 0.4 mg, Take 0.4 mg by mouth daily with dinner, Disp: , Rfl:     TURMERIC PO, Take by mouth, Disp: , Rfl:   Past Medical History:   Diagnosis Date    Atrial fibrillation (HCC)     DMII (diabetes mellitus, type 2) (HCC) 02/06/2021    Hypercholesteremia 02/06/2021    Hypertension 02/06/2021    IBS (irritable bowel syndrome)     " "Constipation predominant    Lyme disease     Migraine 09/13/2017    Osteoarthritis     Post concussive encephalopathy 09/29/2017    RSD (reflex sympathetic dystrophy) 02/06/2021     Past Surgical History:   Procedure Laterality Date    CHOLECYSTECTOMY         CMP:   Lab Results   Component Value Date    K 3.9 10/15/2024    K 4.5 06/29/2021     (H) 10/15/2024     06/29/2021    CO2 27 10/15/2024    CO2 26 06/29/2021    BUN 12 10/15/2024    BUN 12 06/29/2021    CREATININE 0.80 10/15/2024    CREATININE 0.89 06/29/2021    EGFR 97 10/15/2024    EGFR 83 11/18/2020     Lipid Profile:  No results found for: \"CHOL\", \"TRIG\", \"HDL\", \"LDL\"      Social History     Tobacco Use   Smoking Status Every Day    Current packs/day: 1.00    Average packs/day: 1 pack/day for 25.0 years (25.0 ttl pk-yrs)    Types: Cigarettes   Smokeless Tobacco Never               "

## 2024-11-13 ENCOUNTER — TELEPHONE (OUTPATIENT)
Dept: CARDIOLOGY CLINIC | Facility: CLINIC | Age: 59
End: 2024-11-13

## 2024-11-13 NOTE — TELEPHONE ENCOUNTER
Patient called.  He was in the ER x2 with rapid afib.   He had seen Radha on 11/6/24 in the office and cardioversion was discussed with a planned EKG and BP check in two weeks.  He would like to have the cardioversion done sooner, rather than later, as he is very symptomatic and the holidays are coming up.   He has been on Eliquis 5 mg bid since 10/15/24 as well as Cardizem 240 mg daily.   Please advise.     Spoke with Dr Angelo, patient to come in at 1:00 pm tomorrow, 11/13/24 to see Radha.  Dr Angelo will follow up after.

## 2024-11-14 ENCOUNTER — OFFICE VISIT (OUTPATIENT)
Dept: CARDIOLOGY CLINIC | Facility: CLINIC | Age: 59
End: 2024-11-14
Payer: COMMERCIAL

## 2024-11-14 VITALS
BODY MASS INDEX: 29.43 KG/M2 | WEIGHT: 205.6 LBS | HEIGHT: 70 IN | SYSTOLIC BLOOD PRESSURE: 130 MMHG | HEART RATE: 92 BPM | DIASTOLIC BLOOD PRESSURE: 82 MMHG

## 2024-11-14 DIAGNOSIS — I48.19 PERSISTENT ATRIAL FIBRILLATION (HCC): Primary | ICD-10-CM

## 2024-11-14 DIAGNOSIS — I10 PRIMARY HYPERTENSION: ICD-10-CM

## 2024-11-14 PROCEDURE — 99214 OFFICE O/P EST MOD 30 MIN: CPT | Performed by: NURSE PRACTITIONER

## 2024-11-14 NOTE — H&P (VIEW-ONLY)
" Patient ID: Oscar Hardin is a 59 y.o. male.        Plan:      Assessment & Plan  Primary hypertension  Well controlled  Continue to monitor on current regimen  Persistent atrial fibrillation (HCC)  Rate now controlled  Continue Cardizem 120 mg daily  Eliquis 5 mg bid      Follow up Plan/Summary Comments:  Because of Oscar's symptomatic atrial fibrillation, arrangements will be made for cardioversion.  He has been compliant with Eliquis 5 mg twice daily for the last 4 weeks.    Arrangements will be made for follow-up after the cardioversion.    HPI: Oscar is seen in the office today for follow-up of A-fib.    He was seen approximately 1 week ago at which time his heart rate was not adequately controlled and diltiazem was increased to 240 mg daily.  He has been maintained on Eliquis 5 mg twice daily with plans for eventual cardioversion.    Cinthia presents today and states he has not been feeling well.  He is short of breath with walking the steps and has been very tired.  He says all he wants to do is rest.  He does get lightheaded when standing up too quickly.  He denies any chest discomfort or syncope.    Review of Systems   10  point ROS  was otherwise non pertinent or negative except as per HPI or as below.   Gait: Normal      Most recent or relevant cardiac/vascular testing:    Echo 10/15/2024  EF 60%  Mild concentric LVH  No significant valvular disease noted      Objective:     /82   Pulse 92   Ht 5' 10\" (1.778 m)   Wt 93.3 kg (205 lb 9.6 oz)   BMI 29.50 kg/m²     PHYSICAL EXAM:    General:  Normal appearance, no acute distress  Eyes:  Anicteric.  Oral mucosa:  Moist.  Neck:  No JVD. Carotid upstrokes are brisk without bruits.  No masses.  Chest:  Clear to auscultation   Cardiac: Irregularly irregular.  No palpable PMI.  Normal S1 and S2.  No murmur gallop or rub.  Abdomen:  Soft and nontender. No palpable organomegaly or aortic enlargement.  Extremities:  No peripheral edema.  Musculoskeletal:  " Symmetric.   Vascular:  Pedal pulses are intact.  Neuro:  Grossly symmetric.  Psych:  Alert and oriented x3.    No Known Allergies    Current Outpatient Medications:     apixaban (ELIQUIS) 5 mg, Take 1 tablet (5 mg total) by mouth 2 (two) times a day, Disp: 60 tablet, Rfl: 0    ASHWAGANDHA PO, Take by mouth, Disp: , Rfl:     BIOTIN PO, Take by mouth, Disp: , Rfl:     cyclobenzaprine (FLEXERIL) 10 mg tablet, Take 10 mg by mouth as needed, Disp: , Rfl:     diltiazem (CARDIZEM CD) 240 mg 24 hr capsule, Take 1 capsule (240 mg total) by mouth daily, Disp: 30 capsule, Rfl: 11    irbesartan-hydrochlorothiazide (AVALIDE) 300-12.5 MG per tablet, Take 1 tablet by mouth daily, Disp: , Rfl:     MILK THISTLE PO, Take by mouth, Disp: , Rfl:     MULTIPLE VITAMIN PO, Take by mouth, Disp: , Rfl:     Niacin (VITAMIN B-3 PO), Take by mouth, Disp: , Rfl:     RESVERATROL PO, Take by mouth, Disp: , Rfl:     tadalafil (CIALIS) 5 MG tablet, Take 5 mg by mouth in the morning, Disp: , Rfl:     tamsulosin (FLOMAX) 0.4 mg, Take 0.4 mg by mouth daily with dinner, Disp: , Rfl:     TURMERIC PO, Take by mouth, Disp: , Rfl:   Past Medical History:   Diagnosis Date    Atrial fibrillation (HCC)     Atrial fibrillation with rapid ventricular response (HCC)     Diabetes mellitus (HCC)     DMII (diabetes mellitus, type 2) (HCC) 02/06/2021    Hypercholesteremia 02/06/2021    Hypertension 02/06/2021    IBS (irritable bowel syndrome)     Constipation predominant    Lyme disease     Migraine 09/13/2017    Osteoarthritis     Persistent atrial fibrillation (HCC)     Post concussive encephalopathy 09/29/2017    RSD (reflex sympathetic dystrophy) 02/06/2021     Past Surgical History:   Procedure Laterality Date    CHOLECYSTECTOMY         CMP:   Lab Results   Component Value Date    K 3.9 10/15/2024    K 4.5 06/29/2021     (H) 10/15/2024     06/29/2021    CO2 27 10/15/2024    CO2 26 06/29/2021    BUN 12 10/15/2024    BUN 12 06/29/2021    CREATININE  "0.80 10/15/2024    CREATININE 0.89 06/29/2021    EGFR 97 10/15/2024    EGFR 83 11/18/2020     Lipid Profile:  No results found for: \"CHOL\", \"TRIG\", \"HDL\", \"LDL\"      Social History     Tobacco Use   Smoking Status Every Day    Current packs/day: 1.00    Average packs/day: 1 pack/day for 35.0 years (35.0 ttl pk-yrs)    Types: Cigarettes   Smokeless Tobacco Never               "

## 2024-11-15 DIAGNOSIS — I48.19 PERSISTENT ATRIAL FIBRILLATION (HCC): Primary | ICD-10-CM

## 2024-11-20 ENCOUNTER — HOSPITAL ENCOUNTER (OUTPATIENT)
Dept: NON INVASIVE DIAGNOSTICS | Facility: HOSPITAL | Age: 59
Discharge: HOME | End: 2024-11-20
Attending: INTERNAL MEDICINE
Payer: COMMERCIAL

## 2024-11-20 ENCOUNTER — ANESTHESIA (OUTPATIENT)
Dept: NON INVASIVE DIAGNOSTICS | Facility: HOSPITAL | Age: 59
End: 2024-11-20
Payer: COMMERCIAL

## 2024-11-20 ENCOUNTER — ANESTHESIA EVENT (OUTPATIENT)
Dept: NON INVASIVE DIAGNOSTICS | Facility: HOSPITAL | Age: 59
End: 2024-11-20
Payer: COMMERCIAL

## 2024-11-20 VITALS
SYSTOLIC BLOOD PRESSURE: 118 MMHG | WEIGHT: 205 LBS | BODY MASS INDEX: 29.35 KG/M2 | HEIGHT: 70 IN | RESPIRATION RATE: 20 BRPM | TEMPERATURE: 97.4 F | DIASTOLIC BLOOD PRESSURE: 76 MMHG | HEART RATE: 57 BPM | OXYGEN SATURATION: 97 %

## 2024-11-20 DIAGNOSIS — I48.19 PERSISTENT ATRIAL FIBRILLATION (HCC): ICD-10-CM

## 2024-11-20 LAB
ATRIAL RATE: 192 BPM
ATRIAL RATE: 468 BPM
ATRIAL RATE: 55 BPM
GLUCOSE SERPL-MCNC: 175 MG/DL (ref 65–140)
P AXIS: 86 DEGREES
PR INTERVAL: 218 MS
QRS AXIS: -30 DEGREES
QRS AXIS: -34 DEGREES
QRS AXIS: -50 DEGREES
QRSD INTERVAL: 146 MS
QRSD INTERVAL: 150 MS
QRSD INTERVAL: 158 MS
QT INTERVAL: 422 MS
QT INTERVAL: 422 MS
QT INTERVAL: 472 MS
QTC INTERVAL: 451 MS
QTC INTERVAL: 462 MS
QTC INTERVAL: 477 MS
T WAVE AXIS: -16 DEGREES
T WAVE AXIS: 20 DEGREES
T WAVE AXIS: 22 DEGREES
VENTRICULAR RATE: 55 BPM
VENTRICULAR RATE: 72 BPM
VENTRICULAR RATE: 77 BPM

## 2024-11-20 PROCEDURE — 92960 CARDIOVERSION ELECTRIC EXT: CPT | Performed by: INTERNAL MEDICINE

## 2024-11-20 PROCEDURE — 82948 REAGENT STRIP/BLOOD GLUCOSE: CPT

## 2024-11-20 PROCEDURE — 93010 ELECTROCARDIOGRAM REPORT: CPT | Performed by: INTERNAL MEDICINE

## 2024-11-20 PROCEDURE — 92960 CARDIOVERSION ELECTRIC EXT: CPT

## 2024-11-20 PROCEDURE — 93005 ELECTROCARDIOGRAM TRACING: CPT

## 2024-11-20 RX ORDER — SODIUM CHLORIDE, SODIUM LACTATE, POTASSIUM CHLORIDE, CALCIUM CHLORIDE 600; 310; 30; 20 MG/100ML; MG/100ML; MG/100ML; MG/100ML
INJECTION, SOLUTION INTRAVENOUS CONTINUOUS PRN
Status: DISCONTINUED | OUTPATIENT
Start: 2024-11-20 | End: 2024-11-20

## 2024-11-20 RX ORDER — MIDAZOLAM HYDROCHLORIDE 2 MG/2ML
INJECTION, SOLUTION INTRAMUSCULAR; INTRAVENOUS AS NEEDED
Status: DISCONTINUED | OUTPATIENT
Start: 2024-11-20 | End: 2024-11-20

## 2024-11-20 RX ORDER — PROPOFOL 10 MG/ML
INJECTION, EMULSION INTRAVENOUS AS NEEDED
Status: DISCONTINUED | OUTPATIENT
Start: 2024-11-20 | End: 2024-11-20

## 2024-11-20 RX ORDER — SODIUM CHLORIDE, SODIUM LACTATE, POTASSIUM CHLORIDE, CALCIUM CHLORIDE 600; 310; 30; 20 MG/100ML; MG/100ML; MG/100ML; MG/100ML
50 INJECTION, SOLUTION INTRAVENOUS CONTINUOUS
Status: ACTIVE | OUTPATIENT
Start: 2024-11-20 | End: 2024-11-20

## 2024-11-20 RX ADMIN — MIDAZOLAM 2 MG: 1 INJECTION INTRAMUSCULAR; INTRAVENOUS at 10:19

## 2024-11-20 RX ADMIN — SODIUM CHLORIDE, SODIUM LACTATE, POTASSIUM CHLORIDE, AND CALCIUM CHLORIDE: .6; .31; .03; .02 INJECTION, SOLUTION INTRAVENOUS at 10:10

## 2024-11-20 RX ADMIN — PROPOFOL 100 MG: 10 INJECTION, EMULSION INTRAVENOUS at 10:25

## 2024-11-20 NOTE — QUICK NOTE
Oscar Hardin is a 59 y.o. y.o. male who follows with Radha Suero in the office. He is here today for planned cardioversion for symptomatic atrial fibrillation    The patient was identified in the OR lab. A time out procedure was performed.    The patient was sedated by the anesthesia service per anesthesia protocol.    After adequate sedation was confirmed, The patient was cardioverted to sinus rhythm with a single 200 J biphasic shock.      There were no complications.    A 12 lead ECG was performed and confirmed sinus bradycardia with first-degree AV block, PACs, right bundle branch block heart rate 55 bpm.    The patient was monitored for the appropriate time interval in the holding area, and was transferred back to the medical floor in stable condition.    Tom Olivares DO

## 2024-11-20 NOTE — ANESTHESIA PREPROCEDURE EVALUATION
Procedure:  CARDIOVERSION ([place] ONLY)    Daily smoker and alcohol use  Had prior gallbladder surgery and colonoscopy with no anesthesia complications.     Relevant Problems   CARDIO   (+) Hypercholesteremia   (+) Migraine   (+) Persistent atrial fibrillation (HCC)   (+) Primary hypertension      ENDO   (+) DMII (diabetes mellitus, type 2) (HCC)      NEURO/PSYCH   (+) Migraine   (+) RSD (reflex sympathetic dystrophy)        Physical Exam    Airway    Mallampati score: III         Dental    upper dentures and lower dentures    Cardiovascular  Rhythm: irregular, Rate: normal    Pulmonary   Breath sounds clear to auscultation    Other Findings        Anesthesia Plan  ASA Score- 3     Anesthesia Type- IV sedation with anesthesia with ASA Monitors.         Additional Monitors:     Airway Plan:            Plan Factors-Exercise tolerance (METS): >4 METS.    Chart reviewed. EKG reviewed.  Existing labs reviewed. Patient summary reviewed.    Patient is a current smoker.  Patient instructed to abstain from smoking on day of procedure. Patient did not smoke on day of surgery.            Induction-     Postoperative Plan-         Informed Consent- Anesthetic plan and risks discussed with patient and spouse.  I personally reviewed this patient with the CRNA. Discussed and agreed on the Anesthesia Plan with the CRNA..

## 2024-11-20 NOTE — ANESTHESIA POSTPROCEDURE EVALUATION
Post-Op Assessment Note    CV Status:  Stable  Pain Score: 0    Pain management: adequate       Mental Status:  Arousable   Hydration Status:  Stable   PONV Controlled:  None   Airway Patency:  Patent     Post Op Vitals Reviewed: Yes    No anethesia notable event occurred.    Staff: CRNA       Last Filed PACU Vitals:  Vitals Value Taken Time   Temp 97.4    Pulse 53 11/20/24 1033   /66 11/20/24 1033   Resp 18 11/20/24 1033   SpO2 99 % 11/20/24 1033   Vitals shown include unfiled device data.    Modified Noel:  No data recorded

## 2024-11-20 NOTE — INTERVAL H&P NOTE
"Update:     H&P reviewed. After examining the patient, I find no changed to the H&P since it had been written.    -Risk, benefits, alternatives to procedure explained to the patient in full and all questions answered.  Written and verbal consent obtained.  Patient notes being 100% compliant with oral anticoagulation without any missed doses for at least 4 weeks and did take his oral anticoagulation (Eliquis 5 mg) this morning.  Patient wishes to proceed with cardioversion as recommended by outpatient cardiology team.    Patient confirmed to be in atrial fibrillation with twelve-lead ECG prior to procedure.    Patient re-evaluated. Accept as history and physical.  /85   Pulse 78   Temp 98.5 °F (36.9 °C) (Temporal)   Resp 17   Ht 5' 10\" (1.778 m)   Wt 93 kg (205 lb)   SpO2 97%   BMI 29.41 kg/m²      Tom Olivares, DO/November 20, 2024/9:37 AM  " Chest pain for Evaluation English

## 2024-11-20 NOTE — ANESTHESIA POSTPROCEDURE EVALUATION
Post-Op Assessment Note    CV Status:  Stable    Pain management: adequate       Mental Status:  Alert   Hydration Status:  Stable   PONV Controlled:  None   Airway Patency:  Patent     Post Op Vitals Reviewed: Yes    No anethesia notable event occurred.    Staff: Anesthesiologist       Last Filed PACU Vitals:  Vitals Value Taken Time   Temp 97.4 °F (36.3 °C) 11/20/24 1035   Pulse 62 11/20/24 1107   /72 11/20/24 1105   Resp 27 11/20/24 1107   SpO2 98 % 11/20/24 1107   Vitals shown include unfiled device data.    Modified Noel:  Activity: 2 (11/20/2024 11:30 AM)  Respiration: 2 (11/20/2024 11:30 AM)  Circulation: 2 (11/20/2024 11:30 AM)  Consciousness: 2 (11/20/2024 11:30 AM)  Oxygen Saturation: 2 (11/20/2024 11:30 AM)  Modified Noel Score: 10 (11/20/2024 11:30 AM)

## 2024-11-20 NOTE — DISCHARGE INSTR - AVS FIRST PAGE
-Please be 100% compliant with oral anticoagulation and monitor heart rates and let cardiology office know if significant issue or symptom arises.      Patient Education     Cardioversion Discharge Instructions   About this topic   Your heart has an electrical system that controls each heartbeat and signals your heart to pump blood. The signal starts in the top chambers of the heart and moves to the bottom chambers. The signal repeats with each heartbeat.     A problem in the signal can cause an abnormal heartbeat or arrhythmia. With an abnormal heartbeat, the heart may beat too fast or too slow. It may also beat in an irregular pattern. If they are not treated right away, the abnormal heart rhythms may lead to serious problems, such as heart attack, stroke, and cardiac arrest. To prevent these problems, your doctor may suggest cardioversion.  Cardioversion is done to change an abnormal heart rhythm into a normal heart rhythm. Cardioversion is used to treat very fast or irregular heartbeats. It is done using an energy shock through electrodes placed on your chest or with drugs.     What care is needed at home?   Ask your doctor what you need to do when you go home. Make sure you ask questions if you do not understand what the doctor says. This way you will know what you need to do.  Get lots of rest. Sleep when you feel tired. Avoid doing tiring activities.  Ask your doctor when you may go back to your normal activities like driving or working.  What follow-up care is needed?   Your doctor may ask you to make visits to the office to check on your progress. Be sure to keep these visits.  Your doctor may want you to have more blood tests to watch your condition.  What drugs may be needed?   The doctor may order drugs to:  Prevent blood clots  Keep heartbeat normal  Will physical activity be limited?   You may have to limit your activities. Talk to your doctor about the right amount of activity for you. You may have to  avoid tiring activities that may make your heart beat fast.  What problems could happen?   Pain and bruising on the area where the electrodes were attached  Arrhythmias get worse  Blood clot  When do I need to call the doctor?   Activate the emergency medical system right away if you have signs of a heart attack or stroke. Call 911 in the [place] or [place]. The sooner treatment begins, the better your chances for recovery. Call for emergency help right away if you have:  Signs of heart attack:  Chest pain  Trouble breathing  Fast heartbeat  Feeling dizzy  Signs of stroke:  Sudden numbness or weakness of the face, arm, or leg, especially on one side of the body  Sudden confusion, trouble speaking or understanding  Sudden trouble seeing in one or both eyes  Sudden trouble walking, dizziness, loss of balance or coordination  Sudden severe headache with no known cause  Call your doctor if you have:  Shortness of breath  Very bad muscle pain or weakness  Very bad dizziness  Very upset stomach or throwing up  Teach Back: Helping You Understand   The Teach Back Method helps you understand the information we are giving you. After you talk with the staff, tell them in your own words what you learned. This helps to make sure the staff has described each thing clearly. It also helps to explain things that may have been confusing. Before going home, make sure you can do these:  I can tell you about my condition.  I can tell you when I can go back to my normal activities.  I can tell you what I will do if I have signs of a heart attack or stroke.  Last Reviewed Date   2020-02-24  Consumer Information Use and Disclaimer   This generalized information is a limited summary of diagnosis, treatment, and/or medication information. It is not meant to be comprehensive and should be used as a tool to help the user understand and/or assess potential diagnostic and treatment options. It does NOT include all information about  conditions, treatments, medications, side effects, or risks that may apply to a specific patient. It is not intended to be medical advice or a substitute for the medical advice, diagnosis, or treatment of a health care provider based on the health care provider's examination and assessment of a patient’s specific and unique circumstances. Patients must speak with a health care provider for complete information about their health, medical questions, and treatment options, including any risks or benefits regarding use of medications. This information does not endorse any treatments or medications as safe, effective, or approved for treating a specific patient. [employer] and its affiliates disclaim any warranty or liability relating to this information or the use thereof. The use of this information is governed by the Terms of Use, available at https://www.woltersChangouwer.com/en/know/clinical-effectiveness-terms   Copyright   Copyright © 2024 [employer] and its affiliates and/or licensors. All rights reserved.

## 2024-11-22 DIAGNOSIS — Z00.6 ENCOUNTER FOR EXAMINATION FOR NORMAL COMPARISON OR CONTROL IN CLINICAL RESEARCH PROGRAM: ICD-10-CM

## 2024-12-09 ENCOUNTER — NURSE TRIAGE (OUTPATIENT)
Age: 59
End: 2024-12-09

## 2024-12-09 DIAGNOSIS — I48.19 PERSISTENT ATRIAL FIBRILLATION (HCC): Primary | ICD-10-CM

## 2024-12-09 RX ORDER — DILTIAZEM HYDROCHLORIDE 120 MG/1
120 CAPSULE, EXTENDED RELEASE ORAL DAILY
Qty: 30 CAPSULE | Refills: 11 | Status: SHIPPED | OUTPATIENT
Start: 2024-12-09

## 2024-12-09 NOTE — TELEPHONE ENCOUNTER
Pt called stating that he thought that his medication was going to be adjusted after his cardioversion. He states he continues to go in and out of afib. He decided to also start taking Cardizem 120mg because his HR was 43 and below but if his HR goes up to 130 or above and he is having Afib symptoms he would take another Cardizem 120mg. Pt would like to know if he should continue on the Cardizem 120mg?     Please advise

## 2024-12-09 NOTE — PROGRESS NOTES
Patient called questioning medications.    Underwent cardioversion for PAF 11/20/2024.  Reportedly maintained sinus rhythm for approximately 6 days but since that time has been having episodes of A-fib.  Episodes last 20 minutes to several hours.    Patient continues to take diltiazem 240 mg daily but feels that is too much.  Some heart rates are in the 40s and he is tired.    Reduce diltiazem to 120 mg daily.  Patient to notify our office if heart rate is elevated above 120 bpm for longer than 20-30 minutes.    Scheduled to see Dr. Angelo 1/2/2025

## 2025-01-02 ENCOUNTER — OFFICE VISIT (OUTPATIENT)
Dept: CARDIOLOGY CLINIC | Facility: CLINIC | Age: 60
End: 2025-01-02
Payer: COMMERCIAL

## 2025-01-02 VITALS
DIASTOLIC BLOOD PRESSURE: 90 MMHG | SYSTOLIC BLOOD PRESSURE: 146 MMHG | WEIGHT: 205 LBS | HEART RATE: 125 BPM | BODY MASS INDEX: 29.35 KG/M2 | HEIGHT: 70 IN

## 2025-01-02 DIAGNOSIS — I10 PRIMARY HYPERTENSION: ICD-10-CM

## 2025-01-02 DIAGNOSIS — I48.0 PAROXYSMAL ATRIAL FIBRILLATION (HCC): Primary | ICD-10-CM

## 2025-01-02 DIAGNOSIS — Z72.0 TOBACCO USE: ICD-10-CM

## 2025-01-02 DIAGNOSIS — I48.19 PERSISTENT ATRIAL FIBRILLATION (HCC): ICD-10-CM

## 2025-01-02 PROCEDURE — 99214 OFFICE O/P EST MOD 30 MIN: CPT | Performed by: INTERNAL MEDICINE

## 2025-01-02 PROCEDURE — 93000 ELECTROCARDIOGRAM COMPLETE: CPT | Performed by: INTERNAL MEDICINE

## 2025-01-02 RX ORDER — DILTIAZEM HYDROCHLORIDE 240 MG/1
240 CAPSULE, EXTENDED RELEASE ORAL DAILY
Start: 2025-01-02

## 2025-01-02 NOTE — PROGRESS NOTES
Patient ID: Oscar Hardin is a 59 y.o. male.        Plan:      Assessment & Plan  Persistent atrial fibrillation (HCC)  Recurrence after 1 week post cardioversion.  Heart rate is on the higher side and he feels functional but not optimal.  I am going to increase the diltiazem to 240 mg daily but also pursue ablation.  Patient knows the options and he is amenable to this approach.  Tobacco use  We talked about cutting back or eliminating this as well as alcohol as both can be factors in atrial fibrillation particularly alcohol.  Primary hypertension  Bit high today but better at home and diltiazem dose is being doubled.      Follow up Plan/Other summary comments:  After EP evaluation.    HPI: Patient seen in follow-up today.  1 week post cardioversion he felt recurrence of atrial fibrillation.  Heart rate has varied between 90 and 140 bpm.  He is functional but not quite as strong and energetic as he was prior.  He had cut his diltiazem dose down to 120 mg daily while he was in sinus rhythm as he thought this was bringing down the heart rate but I am going to increase it again.  To reiterate,  I first met the patient in October 2024 when he presented with several months of palpitations and was found to be in atrial fibrillation.  He was also morning the passing of his daughter.  Cardioversion was performed on 11/20/2024 but symptomatically only maintained sinus rhythm for 1 week.        Results for orders placed or performed in visit on 01/02/25   POCT ECG    Impression    Atrial fibrillation with a rapid ventricular response at 125 bpm.  Right bundle branch block pattern.         Most recent or relevant cardiac/vascular testing:    TTE 10/15/2024: LVEF normal.  Minimal dilation of the aortic root 3.8 cm.    Past Surgical History:   Procedure Laterality Date    CARDIOVERSION  11/20/2024    successful atrial fibrillation cardioversion    CHOLECYSTECTOMY         Lipid Profile: Reviewed      Review of Systems   10   "point ROS  was otherwise non pertinent or negative except as per HPI or as below.   Gait:  Normal.        Objective:     /90   Pulse (!) 125   Ht 5' 10\" (1.778 m)   Wt 93 kg (205 lb)   BMI 29.41 kg/m²     PHYSICAL EXAM:    General:  Normal appearance in no distress.  Eyes:  Anicteric.  Oral mucosa:  Moist.  Neck:  No JVD. Carotid upstrokes are brisk without bruits.  No masses.  Chest:  Clear to auscultation.  Cardiac: Irregularly irregular.  No palpable PMI.  Normal S1 and S2.  No murmur gallop or rub.  Abdomen:  Soft and nontender. No palpable organomegaly or aortic enlargement.  Extremities:  No peripheral edema.  Musculoskeletal:  Symmetric.   Vascular:  Femoral pulses are brisk without bruits.  Popliteal pulses are intact bilaterally.   Pedal pulses are intact.  Neuro:  Grossly symmetric.  Psych:  Alert and oriented x3.      Meds reviewed.    Past Medical History:   Diagnosis Date    Atrial fibrillation (HCC)     s/p cardioversion 11/20/2024    DMII (diabetes mellitus, type 2) (HCC) 02/06/2021    Hypercholesteremia 02/06/2021    Hypertension 02/06/2021    IBS (irritable bowel syndrome)     Constipation predominant    Lyme disease     Migraine 09/13/2017    Osteoarthritis     Persistent atrial fibrillation (HCC)     Post concussive encephalopathy 09/29/2017    RSD (reflex sympathetic dystrophy) 02/06/2021           Social History     Tobacco Use   Smoking Status Every Day    Current packs/day: 1.00    Average packs/day: 1 pack/day for 35.0 years (35.0 ttl pk-yrs)    Types: Cigarettes   Smokeless Tobacco Never             "

## 2025-01-02 NOTE — ASSESSMENT & PLAN NOTE
Recurrence after 1 week post cardioversion.  Heart rate is on the higher side and he feels functional but not optimal.  I am going to increase the diltiazem to 240 mg daily but also pursue ablation.  Patient knows the options and he is amenable to this approach.

## 2025-01-02 NOTE — ASSESSMENT & PLAN NOTE
We talked about cutting back or eliminating this as well as alcohol as both can be factors in atrial fibrillation particularly alcohol.

## 2025-01-16 ENCOUNTER — PATIENT MESSAGE (OUTPATIENT)
Dept: CARDIOLOGY CLINIC | Facility: CLINIC | Age: 60
End: 2025-01-16

## 2025-01-16 ENCOUNTER — TELEPHONE (OUTPATIENT)
Dept: CARDIOLOGY CLINIC | Facility: CLINIC | Age: 60
End: 2025-01-16

## 2025-01-16 ENCOUNTER — CONSULT (OUTPATIENT)
Dept: CARDIOLOGY CLINIC | Facility: CLINIC | Age: 60
End: 2025-01-16
Payer: COMMERCIAL

## 2025-01-16 ENCOUNTER — PREP FOR PROCEDURE (OUTPATIENT)
Dept: CARDIOLOGY CLINIC | Facility: CLINIC | Age: 60
End: 2025-01-16

## 2025-01-16 VITALS
WEIGHT: 206.4 LBS | DIASTOLIC BLOOD PRESSURE: 82 MMHG | BODY MASS INDEX: 29.55 KG/M2 | HEIGHT: 70 IN | SYSTOLIC BLOOD PRESSURE: 158 MMHG | HEART RATE: 110 BPM

## 2025-01-16 DIAGNOSIS — E11.9 TYPE 2 DIABETES MELLITUS WITHOUT COMPLICATION, UNSPECIFIED WHETHER LONG TERM INSULIN USE (HCC): ICD-10-CM

## 2025-01-16 DIAGNOSIS — I48.19 PERSISTENT ATRIAL FIBRILLATION (HCC): Primary | ICD-10-CM

## 2025-01-16 PROCEDURE — 99205 OFFICE O/P NEW HI 60 MIN: CPT | Performed by: INTERNAL MEDICINE

## 2025-01-16 PROCEDURE — 93000 ELECTROCARDIOGRAM COMPLETE: CPT | Performed by: INTERNAL MEDICINE

## 2025-01-16 RX ORDER — METOPROLOL SUCCINATE 50 MG/1
50 TABLET, EXTENDED RELEASE ORAL DAILY
Qty: 30 TABLET | Refills: 3 | Status: SHIPPED | OUTPATIENT
Start: 2025-01-16

## 2025-01-16 NOTE — LETTER
2025               CARDIAC ABLATION INSTRUCTIONS     Oscar Hardin   : 1965  MRN: [id number]  [address] [facility] [address]    Procedure: CALLIE/AFIB PFA ABLATION     Procedure Date: 4/10/25    Location: [facility]  Address: [address] [place] [address], [place] [address]        Labs to be done on 3/27/25: CMP /  CBC (FASTING 8 HOURS)    BLOOD THINNER INSTRUCTIONS (Coumadin / Warfarin / Pradaxa / Eliquis / Xarelto):     Eliquis - Keep taking and do not stop.      Medication Hold:     Irbesartan / HCTZ - Do not take day before and morning of procedure.     Arrival Time: The Hospital will contact you the day prior to your procedure, usually between 4PM - 6PM to instruct you on the time and place to report. If you do not hear from a [facility]  by 5PM the evening prior to your procedure, please contact the [place] at [phone].    DO NOT eat or drink ANYTHING after midnight the night prior to your procedure including gum & candy.     You may have a SIP of WATER with your morning medications, UNLESS ADVISE OTHERWISE.     Please notify us if you have been prescribed a NEW MEDICATION prior to your procedure or admitted to the hospital within 30 days.      If you develop a cold, sore throat, fever or any other illness prior to your procedure date, notify your surgeon immediately.    Arrange for a responsible adult to drive you to and from the hospital.    DO NOT stop taking Plavix or Aspirin unless advised otherwise.         If you are currently taking Fish Oil, Krill oil and/or Vitamin E please DO NOT TAKE FOR A WEEK PRIOR TO PROCEDURE.     If you are diabetic, DO NOT take any of your diabetic pills the morning of your procedure. Oral diabetic medications may include Glucophage, Prandin, Glyburide, Micronase, Avandia, Glucovance, Precose, Glynase, and Starlix.     Bring a list of daily medications, vitamins, minerals, herbals and nutritional supplements you take. Please  "include dosages and the times you take them each day.     If you are packing an overnight bag, pack minimal clothing, you will be given hospital sleepwear.   DO NOT bring money, valuables or jewelry. The wedding band is ok.     If you use CPAP machine, bring it to the hospital.      Bring your Photo ID and Insurance cards with you.       FAILURE TO FOLLOW ANY OF THESE INSTRUCTIONS COULD RESULT IN THE CANCELLATION OF YOUR PROCEDURE      Please call [phone] if you do not hear from the  by 5:00PM the night before your procedure.    All patients enter through ENTRANCE B.  Parking is available free of charge or park on Parking Deck B.        Thank you,   Kimberly \"Kay\" Roland    [facility]   [address] [place] [id number]  Teams: [phone]             "

## 2025-01-16 NOTE — TELEPHONE ENCOUNTER
"Patient scheduled for CALLIE/AFIB PFA ABLATION on 4/10/25 at [facility] with Dr. Braga.      Instructions sent to patient through [employer] and in person in office.      Patient aware of all general instructions.    Medication holds:   Irbesartan / HCTZ - Do not take day before and morning of procedure.     Blood Thinners:  Eliquis - Keep taking and do not stop.      Labs to be done on 3/27/25:  CMP / CBC (FASTING 8 HOURS)    CALLIE ordered/completed.     Thank you,  Kimberly \"Kay\" Roland    "

## 2025-01-16 NOTE — PROGRESS NOTES
HEART AND VASCULAR  CARDIAC ELECTROPHYSIOLOGY   HEART RHYTHM CENTER  [facility]    Outpatient New Consult    Today's Date: 01/16/25        Patient name: Oscar Hardin  YOB: 1965  Sex: male         Chief Complaint: Referral for afib from Dr Daley      ASSESSMENT:  Problem List Items Addressed This Visit          Cardiovascular and Mediastinum    Persistent atrial fibrillation (HCC) - Primary    Relevant Medications    metoprolol succinate (TOPROL-XL) 50 mg 24 hr tablet    Other Relevant Orders    POCT ECG    Ambulatory Referral to Sleep Medicine       Endocrine    DMII (diabetes mellitus, type 2) (HCC)     58 yo male  1) Persistent afib [id number] started Oct 2024, ELiqus and dilt started and DCCV Nov 20 but was back in afib 1 week later. Symptomatic with SOB, fatigue decreased exercise tolerance. Rates not well controlled 100bpm today. EF normal mild LAE  Risk factors ETOH 2 drinks a night and smokes, HTN. May have GENESIS not tested yet.    DMT      PLAN:  Recommend Atrial fibrillation and/or flutter ablation.  Additional arrhythmias may also be targeted. Transesophageal echocardiogram maybe used to rule out thrombus. Risks and benefits discussed with patient and family. Explained risk of rare but serious complications like heart perforation, stroke, heart attack, kidney injury. Energy source for ablation my vary including radiofrequency, pulsed field (PFA), or cryoablation. PFA ablation does not have known esophageal injury risk so general safer from this life threatening complication. Risks of phrenic nerve (diaphragm) and lung injury also is less with this energy source.    Explained ablation for atrial fibrillation is treatment not a cure, about 20% of patient will opt for second ablation. We went over usually recovery and expectations of going through procedure.     Add metoprolol 50mg daily to dilt for rate control in meantime  Post ablation can stop.    WIll need monitoring  post ablation so can get off ELiquis          Orders Placed This Encounter   Procedures    Ambulatory Referral to Sleep Medicine    POCT ECG     There are no discontinued medications.      .............................................................................................    HPI/Subjective:     58 yo male  1) Persistent afib [id number] started Oct 2024, ELiqus and dilt started and DCCV Nov 20 but was back in afib 1 week later. Symptomatic with SOB, fatigue decreased exercise tolerance. Rates not well controlled 100bpm today. EF normal mild LAE  Risk factors ETOH 2 drinks a night and smokes, HTN. May have GENESIS not tested yet.      Please note HPI is listed by problem with with update following it, it is copied again in the assessment above and reflects medical decision making as well.           Past Medical History:   Diagnosis Date    Atrial fibrillation (HCC)     s/p cardioversion 11/20/2024    DMII (diabetes mellitus, type 2) (HCC) 02/06/2021    Hypercholesteremia 02/06/2021    Hypertension 02/06/2021    IBS (irritable bowel syndrome)     Constipation predominant    Lyme disease     Migraine 09/13/2017    Osteoarthritis     Persistent atrial fibrillation (HCC)     Post concussive encephalopathy 09/29/2017    RSD (reflex sympathetic dystrophy) 02/06/2021       No Known Allergies  I reviewed the Home Medication list and Allergies in the chart.   Scheduled Meds:  Current Outpatient Medications   Medication Sig Dispense Refill    apixaban (ELIQUIS) 5 mg Take 1 tablet (5 mg total) by mouth 2 (two) times a day 60 tablet 0    ASHWAGANDHA PO Take by mouth      BIOTIN PO Take by mouth      cyclobenzaprine (FLEXERIL) 10 mg tablet Take 10 mg by mouth as needed      diltiazem (DILACOR XR) 240 MG 24 hr capsule Take 1 capsule (240 mg total) by mouth daily      irbesartan-hydrochlorothiazide (AVALIDE) 300-12.5 MG per tablet Take 1 tablet by mouth daily      metoprolol succinate (TOPROL-XL) 50 mg 24 hr tablet Take 1  tablet (50 mg total) by mouth daily 30 tablet 3    MILK THISTLE PO Take by mouth      MULTIPLE VITAMIN PO Take by mouth      Niacin (VITAMIN B-3 PO) Take by mouth      RESVERATROL PO Take by mouth      tadalafil (CIALIS) 5 MG tablet Take 5 mg by mouth in the morning      tamsulosin (FLOMAX) 0.4 mg Take 0.4 mg by mouth daily with dinner      TURMERIC PO Take by mouth       No current facility-administered medications for this visit.     PRN Meds:.        Family History   Problem Relation Age of Onset    Skin cancer Mother     Cervical cancer Daughter        Social History     Socioeconomic History    Marital status: /Civil Union     Spouse name: Not on file    Number of children: Not on file    Years of education: Not on file    Highest education level: Not on file   Occupational History    Occupation: Disabled   Tobacco Use    Smoking status: Every Day     Current packs/day: 1.00     Average packs/day: 1 pack/day for 35.0 years (35.0 ttl pk-yrs)     Types: Cigarettes    Smokeless tobacco: Never   Vaping Use    Vaping status: Never Used   Substance and Sexual Activity    Alcohol use: Yes     Alcohol/week: 3.0 standard drinks of alcohol     Types: 3 Standard drinks or equivalent per week     Comment: daily    Drug use: Never    Sexual activity: Not Currently     Partners: Male   Other Topics Concern    Not on file   Social History Narrative    Not on file     Social Drivers of Health     Financial Resource Strain: Not on file   Food Insecurity: No Food Insecurity (10/14/2024)    Nursing - Inadequate Food Risk Classification     Worried About Running Out of Food in the Last Year: Never true     Ran Out of Food in the Last Year: Never true     Ran Out of Food in the Last Year: Not on file   Transportation Needs: No Transportation Needs (10/14/2024)    PRAPARE - Transportation     Lack of Transportation (Medical): No     Lack of Transportation (Non-Medical): No   Physical Activity: Not on file   Stress: Not on  "file   Social Connections: Not on file   Intimate Partner Violence: Not on file   Housing Stability: Low Risk  (10/14/2024)    Housing Stability Vital Sign     Unable to Pay for Housing in the Last Year: No     Number of Times Moved in the Last Year: 0     Homeless in the Last Year: No         OBJECTIVE:    /82 (BP Location: Right arm, Patient Position: Sitting, Cuff Size: Standard)   Pulse (!) 110   Ht 5' 10\" (1.778 m)   Wt 93.6 kg (206 lb 6.4 oz)   BMI 29.62 kg/m²   Vitals:    01/16/25 1506   Weight: 93.6 kg (206 lb 6.4 oz)     GEN: No acute distress, Alert and oriented, well appearing  HEENT:External ears normal, oral pharynx clear, mucous membranes moist  EYES: Pupils equal, sclera anicteric, midline, normal conjuctiva  NECK: No JVD, supple, no obvious masses or thryomegaly or goiter  CARDIOVASCULAR:  RRR, No murmur, rub, gallops S1,S2  LUNGS: Clear To auscultation bilaterally, normal effort, no rales, rhonchi, crackles   ABDOMEN:  nondistended,  without obvious organomegaly or ascites  EXTREMITIES/VASCULAR: No edema. warm an well perfused.  PSYCH: Normal Affect,  linear speech pattern without evidence of psychosis.   NEURO: Grossly intact, moving all extremiteis equal, face symmetric, alert and responsive, no obvious focal defecits   GAIT:  Ambulates normally without difficulty  HEME: No bleeding, bruising, petechia, purpura   SKIN: No significant rashes on visibile skin, warm, no diaphoresis or pallor.     Lab Results:       LABS:      Chemistry        Component Value Date/Time    K 3.9 10/15/2024 0503    K 4.5 06/29/2021 1033     (H) 10/15/2024 0503     06/29/2021 1033    CO2 27 10/15/2024 0503    CO2 26 06/29/2021 1033    BUN 12 10/15/2024 0503    BUN 12 06/29/2021 1033    CREATININE 0.80 10/15/2024 0503    CREATININE 0.89 06/29/2021 1033        Component Value Date/Time    CALCIUM 8.8 10/15/2024 0503    CALCIUM 8.7 06/29/2021 1033    ALKPHOS 60 10/15/2024 0503    ALKPHOS 77 06/29/2021 " "1033    AST 14 10/15/2024 0503    AST 18 06/29/2021 1033    ALT 24 10/15/2024 0503    ALT 37 06/29/2021 1033            No results found for: \"CHOL\"  No results found for: \"HDL\"  No results found for: \"LDLCALC\"  No results found for: \"TRIG\"  No results found for: \"CHOLHDL\"    IMAGING: No results found.     Cardiac testing:   No results found for this or any previous visit.    No results found for this or any previous visit.    No results found for this or any previous visit.    No results found for this or any previous visit.          I reviewed and interpreted the following LABS/EKG/TELE/IMAGING and below is summary of my interpretation (if data available):    LABS:nl bmp    Current EKG and Rhythm Strip:AFib RBBB 110bpm RVR      Past EKGs and RHYTHM strip: afib RVR 10/14/24  POst DCV Sinus 55bpm        "

## 2025-01-22 DIAGNOSIS — I48.19 PERSISTENT ATRIAL FIBRILLATION (HCC): ICD-10-CM

## 2025-01-22 RX ORDER — DILTIAZEM HYDROCHLORIDE 240 MG/1
240 CAPSULE, EXTENDED RELEASE ORAL DAILY
Qty: 30 CAPSULE | Refills: 5 | Status: SHIPPED | OUTPATIENT
Start: 2025-01-22

## 2025-01-22 NOTE — TELEPHONE ENCOUNTER
Medication: Diltiazem     Dose/Frequency: Take 1 capsule (240 mg total) by mouth daily     Quantity: 30    Pharmacy: [facility]    Office:   [] PCP/Provider -   [x] Speciality/Provider - Dr. Angelo    Does the patient have enough for 3 days?   [] Yes   [x] No - Send as HP to POD

## 2025-02-18 DIAGNOSIS — I48.19 PERSISTENT ATRIAL FIBRILLATION (HCC): ICD-10-CM

## 2025-02-19 RX ORDER — DILTIAZEM HYDROCHLORIDE 240 MG/1
240 CAPSULE, EXTENDED RELEASE ORAL DAILY
Qty: 100 CAPSULE | Refills: 1 | Status: SHIPPED | OUTPATIENT
Start: 2025-02-19

## 2025-04-07 ENCOUNTER — APPOINTMENT (OUTPATIENT)
Dept: LAB | Facility: CLINIC | Age: 60
End: 2025-04-07
Payer: COMMERCIAL

## 2025-04-07 DIAGNOSIS — Z00.6 ENCOUNTER FOR EXAMINATION FOR NORMAL COMPARISON OR CONTROL IN CLINICAL RESEARCH PROGRAM: ICD-10-CM

## 2025-04-07 LAB
ALBUMIN SERPL BCG-MCNC: 4.3 G/DL (ref 3.5–5)
ALP SERPL-CCNC: 82 U/L (ref 34–104)
ALT SERPL W P-5'-P-CCNC: 29 U/L (ref 7–52)
ANION GAP SERPL CALCULATED.3IONS-SCNC: 7 MMOL/L (ref 4–13)
AST SERPL W P-5'-P-CCNC: 13 U/L (ref 13–39)
BASOPHILS # BLD AUTO: 0.04 THOUSANDS/ÂΜL (ref 0–0.1)
BASOPHILS NFR BLD AUTO: 1 % (ref 0–1)
BILIRUB SERPL-MCNC: 0.68 MG/DL (ref 0.2–1)
BUN SERPL-MCNC: 16 MG/DL (ref 5–25)
CALCIUM SERPL-MCNC: 8.9 MG/DL (ref 8.4–10.2)
CHLORIDE SERPL-SCNC: 104 MMOL/L (ref 96–108)
CO2 SERPL-SCNC: 29 MMOL/L (ref 21–32)
CREAT SERPL-MCNC: 0.93 MG/DL (ref 0.6–1.3)
EOSINOPHIL # BLD AUTO: 0.24 THOUSAND/ÂΜL (ref 0–0.61)
EOSINOPHIL NFR BLD AUTO: 3 % (ref 0–6)
ERYTHROCYTE [DISTWIDTH] IN BLOOD BY AUTOMATED COUNT: 12.1 % (ref 11.6–15.1)
GFR SERPL CREATININE-BSD FRML MDRD: 88 ML/MIN/1.73SQ M
GLUCOSE P FAST SERPL-MCNC: 168 MG/DL (ref 65–99)
HCT VFR BLD AUTO: 43.5 % (ref 36.5–49.3)
HGB BLD-MCNC: 14.7 G/DL (ref 12–17)
IMM GRANULOCYTES # BLD AUTO: 0.03 THOUSAND/UL (ref 0–0.2)
IMM GRANULOCYTES NFR BLD AUTO: 0 % (ref 0–2)
LYMPHOCYTES # BLD AUTO: 1.72 THOUSANDS/ÂΜL (ref 0.6–4.47)
LYMPHOCYTES NFR BLD AUTO: 22 % (ref 14–44)
MCH RBC QN AUTO: 32.8 PG (ref 26.8–34.3)
MCHC RBC AUTO-ENTMCNC: 33.8 G/DL (ref 31.4–37.4)
MCV RBC AUTO: 97 FL (ref 82–98)
MONOCYTES # BLD AUTO: 0.67 THOUSAND/ÂΜL (ref 0.17–1.22)
MONOCYTES NFR BLD AUTO: 8 % (ref 4–12)
NEUTROPHILS # BLD AUTO: 5.24 THOUSANDS/ÂΜL (ref 1.85–7.62)
NEUTS SEG NFR BLD AUTO: 66 % (ref 43–75)
NRBC BLD AUTO-RTO: 0 /100 WBCS
PLATELET # BLD AUTO: 224 THOUSANDS/UL (ref 149–390)
PMV BLD AUTO: 12.5 FL (ref 8.9–12.7)
POTASSIUM SERPL-SCNC: 4 MMOL/L (ref 3.5–5.3)
PROT SERPL-MCNC: 6.7 G/DL (ref 6.4–8.4)
RBC # BLD AUTO: 4.48 MILLION/UL (ref 3.88–5.62)
SODIUM SERPL-SCNC: 140 MMOL/L (ref 135–147)
WBC # BLD AUTO: 7.94 THOUSAND/UL (ref 4.31–10.16)

## 2025-04-07 PROCEDURE — 36415 COLL VENOUS BLD VENIPUNCTURE: CPT

## 2025-04-09 ENCOUNTER — ANESTHESIA EVENT (OUTPATIENT)
Dept: NON INVASIVE DIAGNOSTICS | Facility: HOSPITAL | Age: 60
End: 2025-04-09
Payer: COMMERCIAL

## 2025-04-10 ENCOUNTER — APPOINTMENT (OUTPATIENT)
Dept: NON INVASIVE DIAGNOSTICS | Facility: HOSPITAL | Age: 60
End: 2025-04-10
Attending: INTERNAL MEDICINE
Payer: COMMERCIAL

## 2025-04-10 ENCOUNTER — HOSPITAL ENCOUNTER (OUTPATIENT)
Facility: HOSPITAL | Age: 60
Setting detail: OUTPATIENT SURGERY
Discharge: HOME | End: 2025-04-10
Attending: INTERNAL MEDICINE | Admitting: INTERNAL MEDICINE
Payer: COMMERCIAL

## 2025-04-10 ENCOUNTER — ANESTHESIA (OUTPATIENT)
Dept: NON INVASIVE DIAGNOSTICS | Facility: HOSPITAL | Age: 60
End: 2025-04-10
Payer: COMMERCIAL

## 2025-04-10 VITALS
SYSTOLIC BLOOD PRESSURE: 142 MMHG | WEIGHT: 211 LBS | OXYGEN SATURATION: 97 % | DIASTOLIC BLOOD PRESSURE: 84 MMHG | TEMPERATURE: 97.7 F | BODY MASS INDEX: 30.21 KG/M2 | HEIGHT: 70 IN | RESPIRATION RATE: 17 BRPM | HEART RATE: 62 BPM

## 2025-04-10 DIAGNOSIS — I48.19 PERSISTENT ATRIAL FIBRILLATION (HCC): ICD-10-CM

## 2025-04-10 LAB
ANION GAP SERPL CALCULATED.3IONS-SCNC: 7 MMOL/L (ref 4–13)
BASOPHILS # BLD AUTO: 0.04 THOUSANDS/ÂΜL (ref 0–0.1)
BASOPHILS NFR BLD AUTO: 1 % (ref 0–1)
BUN SERPL-MCNC: 13 MG/DL (ref 5–25)
CALCIUM SERPL-MCNC: 8.4 MG/DL (ref 8.4–10.2)
CHLORIDE SERPL-SCNC: 105 MMOL/L (ref 96–108)
CO2 SERPL-SCNC: 26 MMOL/L (ref 21–32)
CREAT SERPL-MCNC: 0.9 MG/DL (ref 0.6–1.3)
EOSINOPHIL # BLD AUTO: 0.25 THOUSAND/ÂΜL (ref 0–0.61)
EOSINOPHIL NFR BLD AUTO: 3 % (ref 0–6)
ERYTHROCYTE [DISTWIDTH] IN BLOOD BY AUTOMATED COUNT: 12 % (ref 11.6–15.1)
GFR SERPL CREATININE-BSD FRML MDRD: 92 ML/MIN/1.73SQ M
GLUCOSE P FAST SERPL-MCNC: 190 MG/DL (ref 65–99)
GLUCOSE SERPL-MCNC: 190 MG/DL (ref 65–140)
GLUCOSE SERPL-MCNC: 245 MG/DL (ref 65–140)
HCT VFR BLD AUTO: 42.5 % (ref 36.5–49.3)
HGB BLD-MCNC: 15.2 G/DL (ref 12–17)
IMM GRANULOCYTES # BLD AUTO: 0.03 THOUSAND/UL (ref 0–0.2)
IMM GRANULOCYTES NFR BLD AUTO: 0 % (ref 0–2)
INR PPP: 0.98 (ref 0.85–1.19)
KCT BLD-ACNC: 231 SEC (ref 89–137)
KCT BLD-ACNC: 299 SEC (ref 89–137)
KCT BLD-ACNC: 312 SEC (ref 89–137)
KCT BLD-ACNC: 331 SEC (ref 89–137)
LYMPHOCYTES # BLD AUTO: 1.5 THOUSANDS/ÂΜL (ref 0.6–4.47)
LYMPHOCYTES NFR BLD AUTO: 19 % (ref 14–44)
MCH RBC QN AUTO: 33.4 PG (ref 26.8–34.3)
MCHC RBC AUTO-ENTMCNC: 35.8 G/DL (ref 31.4–37.4)
MCV RBC AUTO: 93 FL (ref 82–98)
MONOCYTES # BLD AUTO: 0.68 THOUSAND/ÂΜL (ref 0.17–1.22)
MONOCYTES NFR BLD AUTO: 8 % (ref 4–12)
NEUTROPHILS # BLD AUTO: 5.55 THOUSANDS/ÂΜL (ref 1.85–7.62)
NEUTS SEG NFR BLD AUTO: 69 % (ref 43–75)
NRBC BLD AUTO-RTO: 0 /100 WBCS
PLATELET # BLD AUTO: 220 THOUSANDS/UL (ref 149–390)
PMV BLD AUTO: 11.4 FL (ref 8.9–12.7)
POTASSIUM SERPL-SCNC: 3.6 MMOL/L (ref 3.5–5.3)
PROTHROMBIN TIME: 13.3 SECONDS (ref 12.3–15)
QRS AXIS: -37 DEGREES
QRSD INTERVAL: 142 MS
QT INTERVAL: 438 MS
QTC INTERVAL: 506 MS
RBC # BLD AUTO: 4.55 MILLION/UL (ref 3.88–5.62)
SODIUM SERPL-SCNC: 138 MMOL/L (ref 135–147)
SPECIMEN SOURCE: ABNORMAL
T WAVE AXIS: -22 DEGREES
VENTRICULAR RATE: 80 BPM
WBC # BLD AUTO: 8.05 THOUSAND/UL (ref 4.31–10.16)

## 2025-04-10 PROCEDURE — NC001 PR NO CHARGE: Performed by: PHYSICIAN ASSISTANT

## 2025-04-10 PROCEDURE — 93657 TX L/R ATRIAL FIB ADDL: CPT | Performed by: INTERNAL MEDICINE

## 2025-04-10 PROCEDURE — C1760 CLOSURE DEV, VASC: HCPCS | Performed by: INTERNAL MEDICINE

## 2025-04-10 PROCEDURE — 85025 COMPLETE CBC W/AUTO DIFF WBC: CPT | Performed by: PHYSICIAN ASSISTANT

## 2025-04-10 PROCEDURE — 93005 ELECTROCARDIOGRAM TRACING: CPT

## 2025-04-10 PROCEDURE — 93312 ECHO TRANSESOPHAGEAL: CPT

## 2025-04-10 PROCEDURE — C1894 INTRO/SHEATH, NON-LASER: HCPCS | Performed by: INTERNAL MEDICINE

## 2025-04-10 PROCEDURE — C1769 GUIDE WIRE: HCPCS | Performed by: INTERNAL MEDICINE

## 2025-04-10 PROCEDURE — 93656 COMPRE EP EVAL ABLTJ ATR FIB: CPT | Performed by: INTERNAL MEDICINE

## 2025-04-10 PROCEDURE — C1733 CATH, EP, OTHR THAN COOL-TIP: HCPCS | Performed by: INTERNAL MEDICINE

## 2025-04-10 PROCEDURE — 92960 CARDIOVERSION ELECTRIC EXT: CPT | Performed by: INTERNAL MEDICINE

## 2025-04-10 PROCEDURE — C1730 CATH, EP, 19 OR FEW ELECT: HCPCS | Performed by: INTERNAL MEDICINE

## 2025-04-10 PROCEDURE — 80048 BASIC METABOLIC PNL TOTAL CA: CPT | Performed by: PHYSICIAN ASSISTANT

## 2025-04-10 PROCEDURE — C1732 CATH, EP, DIAG/ABL, 3D/VECT: HCPCS | Performed by: INTERNAL MEDICINE

## 2025-04-10 PROCEDURE — C1766 INTRO/SHEATH,STRBLE,NON-PEEL: HCPCS | Performed by: INTERNAL MEDICINE

## 2025-04-10 PROCEDURE — 82948 REAGENT STRIP/BLOOD GLUCOSE: CPT

## 2025-04-10 PROCEDURE — 93655 ICAR CATH ABLTJ DSCRT ARRHYT: CPT | Performed by: INTERNAL MEDICINE

## 2025-04-10 PROCEDURE — 85610 PROTHROMBIN TIME: CPT | Performed by: PHYSICIAN ASSISTANT

## 2025-04-10 PROCEDURE — 93010 ELECTROCARDIOGRAM REPORT: CPT | Performed by: INTERNAL MEDICINE

## 2025-04-10 PROCEDURE — 85347 COAGULATION TIME ACTIVATED: CPT

## 2025-04-10 PROCEDURE — C1759 CATH, INTRA ECHOCARDIOGRAPHY: HCPCS | Performed by: INTERNAL MEDICINE

## 2025-04-10 PROCEDURE — 76937 US GUIDE VASCULAR ACCESS: CPT | Performed by: INTERNAL MEDICINE

## 2025-04-10 DEVICE — PERCLOSE™ PROSTYLE™ SUTURE-MEDIATED CLOSURE AND REPAIR SYSTEM
Type: IMPLANTABLE DEVICE | Status: FUNCTIONAL
Brand: PERCLOSE™ PROSTYLE™

## 2025-04-10 DEVICE — DVC VASC CLSR VASCADE MVP 6-12FR: Type: IMPLANTABLE DEVICE | Site: GROIN | Status: FUNCTIONAL

## 2025-04-10 RX ORDER — ROCURONIUM BROMIDE 10 MG/ML
INJECTION, SOLUTION INTRAVENOUS AS NEEDED
Status: DISCONTINUED | OUTPATIENT
Start: 2025-04-10 | End: 2025-04-10

## 2025-04-10 RX ORDER — PROTAMINE SULFATE 10 MG/ML
INJECTION, SOLUTION INTRAVENOUS AS NEEDED
Status: DISCONTINUED | OUTPATIENT
Start: 2025-04-10 | End: 2025-04-10

## 2025-04-10 RX ORDER — ONDANSETRON 2 MG/ML
INJECTION INTRAMUSCULAR; INTRAVENOUS AS NEEDED
Status: DISCONTINUED | OUTPATIENT
Start: 2025-04-10 | End: 2025-04-10

## 2025-04-10 RX ORDER — INSULIN LISPRO 100 [IU]/ML
2 INJECTION, SOLUTION INTRAVENOUS; SUBCUTANEOUS ONCE
Status: COMPLETED | OUTPATIENT
Start: 2025-04-10 | End: 2025-04-10

## 2025-04-10 RX ORDER — HEPARIN SODIUM 1000 [USP'U]/ML
INJECTION, SOLUTION INTRAVENOUS; SUBCUTANEOUS CODE/TRAUMA/SEDATION MEDICATION
Status: DISCONTINUED | OUTPATIENT
Start: 2025-04-10 | End: 2025-04-10 | Stop reason: HOSPADM

## 2025-04-10 RX ORDER — PROPOFOL 10 MG/ML
INJECTION, EMULSION INTRAVENOUS AS NEEDED
Status: DISCONTINUED | OUTPATIENT
Start: 2025-04-10 | End: 2025-04-10

## 2025-04-10 RX ORDER — ACETAMINOPHEN 325 MG/1
650 TABLET ORAL EVERY 4 HOURS PRN
Status: CANCELLED | OUTPATIENT
Start: 2025-04-10

## 2025-04-10 RX ORDER — ACETAMINOPHEN 325 MG/1
650 TABLET ORAL EVERY 4 HOURS PRN
Status: DISCONTINUED | OUTPATIENT
Start: 2025-04-10 | End: 2025-04-10 | Stop reason: HOSPADM

## 2025-04-10 RX ORDER — GLYCOPYRROLATE 0.2 MG/ML
INJECTION INTRAMUSCULAR; INTRAVENOUS AS NEEDED
Status: DISCONTINUED | OUTPATIENT
Start: 2025-04-10 | End: 2025-04-10

## 2025-04-10 RX ORDER — LIDOCAINE HYDROCHLORIDE AND EPINEPHRINE 10; 10 MG/ML; UG/ML
INJECTION, SOLUTION INFILTRATION; PERINEURAL CODE/TRAUMA/SEDATION MEDICATION
Status: DISCONTINUED | OUTPATIENT
Start: 2025-04-10 | End: 2025-04-10 | Stop reason: HOSPADM

## 2025-04-10 RX ORDER — HEPARIN SODIUM 10000 [USP'U]/100ML
INJECTION, SOLUTION INTRAVENOUS
Status: COMPLETED | OUTPATIENT
Start: 2025-04-10 | End: 2025-04-10

## 2025-04-10 RX ORDER — LIDOCAINE HYDROCHLORIDE 10 MG/ML
INJECTION, SOLUTION EPIDURAL; INFILTRATION; INTRACAUDAL; PERINEURAL AS NEEDED
Status: DISCONTINUED | OUTPATIENT
Start: 2025-04-10 | End: 2025-04-10

## 2025-04-10 RX ORDER — NITROGLYCERIN 20 MG/100ML
INJECTION INTRAVENOUS CODE/TRAUMA/SEDATION MEDICATION
Status: DISCONTINUED | OUTPATIENT
Start: 2025-04-10 | End: 2025-04-10 | Stop reason: HOSPADM

## 2025-04-10 RX ORDER — ALBUTEROL SULFATE 90 UG/1
INHALANT RESPIRATORY (INHALATION) AS NEEDED
Status: DISCONTINUED | OUTPATIENT
Start: 2025-04-10 | End: 2025-04-10

## 2025-04-10 RX ORDER — SODIUM CHLORIDE 9 MG/ML
INJECTION, SOLUTION INTRAVENOUS CONTINUOUS PRN
Status: DISCONTINUED | OUTPATIENT
Start: 2025-04-10 | End: 2025-04-10

## 2025-04-10 RX ORDER — FENTANYL CITRATE/PF 50 MCG/ML
50 SYRINGE (ML) INJECTION
Status: DISCONTINUED | OUTPATIENT
Start: 2025-04-10 | End: 2025-04-10 | Stop reason: HOSPADM

## 2025-04-10 RX ORDER — DEXAMETHASONE SODIUM PHOSPHATE 10 MG/ML
INJECTION, SOLUTION INTRAMUSCULAR; INTRAVENOUS AS NEEDED
Status: DISCONTINUED | OUTPATIENT
Start: 2025-04-10 | End: 2025-04-10

## 2025-04-10 RX ORDER — SODIUM CHLORIDE 9 MG/ML
20 INJECTION, SOLUTION INTRAVENOUS ONCE
Status: COMPLETED | OUTPATIENT
Start: 2025-04-10 | End: 2025-04-10

## 2025-04-10 RX ORDER — FENTANYL CITRATE 50 UG/ML
INJECTION, SOLUTION INTRAMUSCULAR; INTRAVENOUS AS NEEDED
Status: DISCONTINUED | OUTPATIENT
Start: 2025-04-10 | End: 2025-04-10

## 2025-04-10 RX ORDER — FUROSEMIDE 10 MG/ML
INJECTION INTRAMUSCULAR; INTRAVENOUS AS NEEDED
Status: DISCONTINUED | OUTPATIENT
Start: 2025-04-10 | End: 2025-04-10

## 2025-04-10 RX ADMIN — GLYCOPYRROLATE 0.2 MG: 0.2 INJECTION, SOLUTION INTRAMUSCULAR; INTRAVENOUS at 11:22

## 2025-04-10 RX ADMIN — ONDANSETRON 4 MG: 2 INJECTION INTRAMUSCULAR; INTRAVENOUS at 13:09

## 2025-04-10 RX ADMIN — NOREPINEPHRINE BITARTRATE 16 MCG: 1 INJECTION, SOLUTION, CONCENTRATE INTRAVENOUS at 12:02

## 2025-04-10 RX ADMIN — SUGAMMADEX 200 MG: 100 INJECTION, SOLUTION INTRAVENOUS at 13:20

## 2025-04-10 RX ADMIN — NOREPINEPHRINE BITARTRATE 8 MCG: 1 INJECTION, SOLUTION, CONCENTRATE INTRAVENOUS at 12:33

## 2025-04-10 RX ADMIN — NOREPINEPHRINE BITARTRATE 24 MCG: 1 INJECTION, SOLUTION, CONCENTRATE INTRAVENOUS at 12:17

## 2025-04-10 RX ADMIN — FENTANYL CITRATE 50 MCG: 50 INJECTION INTRAMUSCULAR; INTRAVENOUS at 11:03

## 2025-04-10 RX ADMIN — PROTAMINE SULFATE 50 MG: 10 INJECTION, SOLUTION INTRAVENOUS at 13:18

## 2025-04-10 RX ADMIN — NOREPINEPHRINE BITARTRATE 8 MCG: 1 INJECTION, SOLUTION, CONCENTRATE INTRAVENOUS at 12:07

## 2025-04-10 RX ADMIN — PROPOFOL 150 MG: 10 INJECTION, EMULSION INTRAVENOUS at 11:03

## 2025-04-10 RX ADMIN — SODIUM CHLORIDE: 0.9 INJECTION, SOLUTION INTRAVENOUS at 11:50

## 2025-04-10 RX ADMIN — PHENYLEPHRINE HYDROCHLORIDE 30 MCG/MIN: 10 INJECTION INTRAVENOUS at 11:17

## 2025-04-10 RX ADMIN — ROCURONIUM BROMIDE 50 MG: 10 INJECTION, SOLUTION INTRAVENOUS at 12:18

## 2025-04-10 RX ADMIN — SODIUM CHLORIDE 20 ML/HR: 0.9 INJECTION, SOLUTION INTRAVENOUS at 09:27

## 2025-04-10 RX ADMIN — FUROSEMIDE 20 MG: 10 INJECTION, SOLUTION INTRAVENOUS at 13:15

## 2025-04-10 RX ADMIN — ROCURONIUM BROMIDE 50 MG: 10 INJECTION, SOLUTION INTRAVENOUS at 11:04

## 2025-04-10 RX ADMIN — LIDOCAINE HYDROCHLORIDE 1.5 ML: 10 INJECTION, SOLUTION EPIDURAL; INFILTRATION; INTRACAUDAL; PERINEURAL at 11:03

## 2025-04-10 RX ADMIN — FENTANYL CITRATE 50 MCG: 50 INJECTION INTRAMUSCULAR; INTRAVENOUS at 13:29

## 2025-04-10 RX ADMIN — NOREPINEPHRINE BITARTRATE 16 MCG: 1 INJECTION, SOLUTION, CONCENTRATE INTRAVENOUS at 12:14

## 2025-04-10 RX ADMIN — INSULIN LISPRO 2 UNITS: 100 INJECTION, SOLUTION INTRAVENOUS; SUBCUTANEOUS at 14:37

## 2025-04-10 RX ADMIN — ALBUTEROL SULFATE 2 PUFF: 90 AEROSOL, METERED RESPIRATORY (INHALATION) at 11:31

## 2025-04-10 RX ADMIN — DEXAMETHASONE SODIUM PHOSPHATE 10 MG: 10 INJECTION, SOLUTION INTRAMUSCULAR; INTRAVENOUS at 11:10

## 2025-04-10 RX ADMIN — SODIUM CHLORIDE: 0.9 INJECTION, SOLUTION INTRAVENOUS at 10:54

## 2025-04-10 NOTE — DISCHARGE INSTR - AVS FIRST PAGE
PLEASE NOTE THE FOLLOWING MEDICATION RECOMMENDATIONS:  - discontinue metoprolol  - continue Eliquis and diltiazem as previously instructed        RESTRICTIONS:  No heavy lifting or strenuous activity for one week.    No soaking in a bath tub/hot tub/swimming pool for one week or until groin heals. You may shower - please let soap and water run over the groins, no scrubbing, and pat the area dry. Please place band-aid on groins daily for up to five days, but you may remove sooner if no issues are noted.     If you notice ongoing bleeding, swelling, or firm lumps in groin near ablation incision, please contact Dr. Braga' office - [phone].

## 2025-04-10 NOTE — Clinical Note
Defib pad site: left lower flank.Defib pad site: RIGHT PECTORIS.Defib pad site assessment: skin integrity intact.

## 2025-04-10 NOTE — ASSESSMENT & PLAN NOTE
Symptomatic persistent atrial fibrillation with periods of rapid ventricular response, currently rate controlled with diltiazem and metoprolol  10/2024 - initially diagnosed, presented to PCP for routine follow-up and found to be in new onset rapid atrial fibrillation  Seen by cardiology, started on Eliquis anticoagulation + Cardizem  Discharged with plan for ongoing rate control  11/2024 - seen in office follow-up with ongoing symptoms noted, cardioversion recommended  Subsequently underwent successful cardioversion  Reported recurrent A-fib 1 week later  1/2025 - seen in EP consultation due to symptomatic persistent A-fib, ablation recommended      Normal LV systolic function with LVEF 60% per echo 10/2024

## 2025-04-10 NOTE — ANESTHESIA POSTPROCEDURE EVALUATION
Post-Op Assessment Note    CV Status:  Stable    Pain management: adequate       Mental Status:  Alert and awake   Hydration Status:  Euvolemic   PONV Controlled:  Controlled   Airway Patency:  Patent     Post Op Vitals Reviewed: Yes    No anethesia notable event occurred.    Staff: Anesthesiologist           Last Filed PACU Vitals:  Vitals Value Taken Time   Temp 97.2 °F (36.2 °C) 04/10/25 1355   Pulse 60 04/10/25 1424   /59 04/10/25 1415   Resp 16 04/10/25 1424   SpO2 94 % 04/10/25 1424   Vitals shown include unfiled device data.    Modified Neol:     Vitals Value Taken Time   Activity 2 04/10/25 1400   Respiration 2 04/10/25 1400   Circulation 2 04/10/25 1400   Consciousness 2 04/10/25 1400   Oxygen Saturation 2 04/10/25 1400     Modified Noel Score: 10

## 2025-04-10 NOTE — H&P
H&P Exam - Cardiology   Oscar Hardin 60 y.o. male MRN: [id number]  Unit/Bed#: BE CATH LAB ROOM Encounter: [id number]    Assessment & Plan     Assessment:  Assessment & Plan  Persistent atrial fibrillation (HCC)  Symptomatic persistent atrial fibrillation with periods of rapid ventricular response, currently rate controlled with diltiazem and metoprolol  10/2024 - initially diagnosed, presented to PCP for routine follow-up and found to be in new onset rapid atrial fibrillation  Seen by cardiology, started on Eliquis anticoagulation + Cardizem  Discharged with plan for ongoing rate control  11/2024 - seen in office follow-up with ongoing symptoms noted, cardioversion recommended  Subsequently underwent successful cardioversion  Reported recurrent A-fib 1 week later  1/2025 - seen in EP consultation due to symptomatic persistent A-fib, ablation recommended      Normal LV systolic function with LVEF 60% per echo 10/2024  Primary hypertension  Hypertension, maintained on irbesartan-HCTZ, Toprol-XL, and diltiazem        Plan:  CALLIE/atrial fibrillation ablation    He will require several hours of bedrest in the postablation setting, anticipate discharge home later today if no issues noted.  All discharge instructions and restrictions will be reviewed with the patient, follow-up will be arranged.    He was recently started on metoprolol for ongoing rate control, this will likely be discontinued.  Plan on continuing diltiazem and Eliquis in the postablation setting.      History of Present Illness   HPI:  Oscar Hardin is a 60 y.o. year old male with symptomatic persistent atrial fibrillation.  Zareva ventricular response, normal LV systolic function with LVEF 60% per echo 10/2024, hypertension, hyperglycemia maintained with diet control at this time, and prior alcohol/tobacco use.  He typically follows with Dr. Angelo as an outpatient.  He was seen in routine follow-up by his PCP 10/2024, at which time he reported  palpitations.  He was found to be in new onset atrial fibrillation with right ventricular response, and was sent to the ED for further evaluation.  He was seen by cardiology, and was started on Eliquis anticoagulation along with Cardizem for rate control.  He was discharged in rate controlled A-fib with plans for outpatient follow-up.  He was seen in the office the following month, at which time he reported ongoing symptoms and thus cardioversion was recommended.  He subsequently underwent successful cardioversion, however he reverted back to symptomatic atrial fibrillation only 1 week later.  He has required several adjustments in his diltiazem dose for ongoing rate control.  He is eventually seen in EP consultation by Dr. Braga 1/2025, at which time ablation was recommended.  He was started on metoprolol for better rate control, with plans to discontinue in the postablation setting.  He presents today to undergo that procedure.      Review of Systems  ROS as noted above, otherwise 12 point review of systems was performed and is negative.       Historical Information   Past Medical History:   Diagnosis Date    Atrial fibrillation (HCC)     s/p cardioversion 11/20/2024    DMII (diabetes mellitus, type 2) (HCC) 02/06/2021    Hypercholesteremia 02/06/2021    Hypertension 02/06/2021    IBS (irritable bowel syndrome)     Constipation predominant    Lyme disease     Migraine 09/13/2017    Osteoarthritis     Persistent atrial fibrillation (HCC)     Post concussive encephalopathy 09/29/2017    RSD (reflex sympathetic dystrophy) 02/06/2021     Past Surgical History:   Procedure Laterality Date    CARDIOVERSION  11/20/2024    successful atrial fibrillation cardioversion    CHOLECYSTECTOMY       Family History:   Family History   Problem Relation Age of Onset    Skin cancer Mother     Cervical cancer Daughter        Social History   Social History     Substance and Sexual Activity   Alcohol Use Yes    Alcohol/week: 3.0  "standard drinks of alcohol    Types: 3 Standard drinks or equivalent per week    Comment: daily     Social History     Substance and Sexual Activity   Drug Use Never     Social History     Tobacco Use   Smoking Status Every Day    Current packs/day: 1.00    Average packs/day: 1 pack/day for 35.0 years (35.0 ttl pk-yrs)    Types: Cigarettes   Smokeless Tobacco Never         Meds/Allergies   all medications and allergies reviewed  Home Medications:   Medications Prior to Admission:     apixaban (ELIQUIS) 5 mg    ASHWAGANDHA PO    diltiazem (Dilt-XR) 240 MG 24 hr capsule    metoprolol succinate (TOPROL-XL) 50 mg 24 hr tablet    MILK THISTLE PO    MULTIPLE VITAMIN PO    RESVERATROL PO    tadalafil (CIALIS) 5 MG tablet    tamsulosin (FLOMAX) 0.4 mg    TURMERIC PO    BIOTIN PO    cyclobenzaprine (FLEXERIL) 10 mg tablet    irbesartan-hydrochlorothiazide (AVALIDE) 300-12.5 MG per tablet    Niacin (VITAMIN B-3 PO)    No Known Allergies    Objective   Vitals: Blood pressure 160/95, pulse 86, temperature 97.7 °F (36.5 °C), temperature source Temporal, resp. rate 16, height 5' 10\" (1.778 m), weight 95.7 kg (211 lb), SpO2 97%.  Orthostatic Blood Pressures      Flowsheet Row Most Recent Value   Blood Pressure 160/95 filed at 04/10/2025 1000            No intake or output data in the 24 hours ending 04/10/25 1023    Invasive Devices       Peripheral Intravenous Line  Duration             Peripheral IV 04/10/25 Distal;Left;Upper;Ventral (anterior) Arm <1 day    Peripheral IV 04/10/25 Right Antecubital <1 day                    Physical Exam  GEN: NAD, alert and oriented x 3, well appearing  SKIN: dry without significant lesions or rashes  HEENT: NCAT, PERRL, EOMs intact  NECK: No JVD appreciated  CARDIOVASCULAR: Irregularly irregular  LUNGS: No respiratory distress, good overall effort, no audible rhonchi or wheezing noted  ABDOMEN: Soft, nontender, nondistended  EXTREMITIES/VASCULAR: perfused without clubbing, cyanosis, or LE " edema b/l  PSYCH: Normal mood and affect  NEURO: CN ll-Xll grossly intact      Lab Results: I have personally reviewed pertinent lab results.    Results from last 7 days   Lab Units 04/10/25  0925 04/07/25  0757   WBC Thousand/uL 8.05 7.94   HEMOGLOBIN g/dL 15.2 14.7   HEMATOCRIT % 42.5 43.5   PLATELETS Thousands/uL 220 224     Results from last 7 days   Lab Units 04/10/25  0925 04/07/25  0757   POTASSIUM mmol/L 3.6 4.0   CHLORIDE mmol/L 105 104   CO2 mmol/L 26 29   BUN mg/dL 13 16   CREATININE mg/dL 0.90 0.93   CALCIUM mg/dL 8.4 8.9     Results from last 7 days   Lab Units 04/10/25  0925   INR  0.98             Imaging: Results Review Statement: No pertinent imaging studies reviewed.    ECHO: No results found for this or any previous visit.      Results for orders placed during the hospital encounter of 10/14/24    Echo complete w/ contrast if indicated    Interpretation Summary    Left Ventricle: Left ventricular cavity size is normal. There is mild concentric hypertrophy. The left ventricular ejection fraction is 60%. Systolic function is normal. Wall motion is normal.    Aorta: The aortic root is mildly dilated to 3.80 cm.        EKG:         Code Status: Level 1 - Full Code

## 2025-04-10 NOTE — ANESTHESIA POSTPROCEDURE EVALUATION
Post-Op Assessment Note    CV Status:  Stable  Pain Score: 0    Pain management: adequate       Mental Status:  Alert and awake   Hydration Status:  Euvolemic   PONV Controlled:  Controlled   Airway Patency:  Patent     Post Op Vitals Reviewed: Yes    No anethesia notable event occurred.    Staff: Anesthesiologist, CRNA           Last Filed PACU Vitals:  Vitals Value Taken Time   Temp 97.2    Pulse 59 04/10/25 1357   /70 04/10/25 1356   Resp 16 04/10/25 1357   SpO2 93 % room air 04/10/25 1357   Vitals shown include unfiled device data.

## 2025-04-10 NOTE — ANESTHESIA PREPROCEDURE EVALUATION
Procedure:  Cardiac eps/afib ablation PFA (Chest)    Relevant Problems   CARDIO   (+) Hypercholesteremia   (+) Migraine   (+) Persistent atrial fibrillation (HCC)   (+) Primary hypertension      ENDO   (+) DMII (diabetes mellitus, type 2) (HCC)      NEURO/PSYCH   (+) Migraine   (+) RSD (reflex sympathetic dystrophy)      Behavioral Health   (+) Alcohol use      Denies recent fever, cough or other symptom of upper respiratory tract infection.    Confirmed NPO appropriate    Physical Exam    Airway    Mallampati score: III  TM Distance: >3 FB  Neck ROM: full     Dental    upper dentures and lower dentures    Cardiovascular      Pulmonary      Other Findings        10/15/24 TTE: Normal biventricular systolic function. No significant valvular disease.    Anesthesia Plan  ASA Score- 3     Anesthesia Type- general with ASA Monitors.         Additional Monitors:     Airway Plan: ETT.           Plan Factors-Exercise tolerance (METS): >4 METS.Exercise comment: Able to climb two flights of stairs without cardiopulmonary limitation.    Chart reviewed.   Existing labs reviewed.     Patient is a current smoker.  Patient did not smoke on day of surgery.            Induction- intravenous.    Postoperative Plan-     Perioperative Resuscitation Plan - Level 1 - Full Code.       Informed Consent- Anesthetic plan and risks discussed with patient.        NPO Status:  No vitals data found for the desired time range.

## 2025-04-11 LAB
ATRIAL RATE: 59 BPM
P AXIS: 78 DEGREES
PR INTERVAL: 200 MS
QRS AXIS: -30 DEGREES
QRSD INTERVAL: 140 MS
QT INTERVAL: 484 MS
QTC INTERVAL: 479 MS
T WAVE AXIS: -26 DEGREES
VENTRICULAR RATE: 59 BPM

## 2025-04-11 PROCEDURE — 93010 ELECTROCARDIOGRAM REPORT: CPT | Performed by: INTERNAL MEDICINE

## 2025-04-15 LAB — SL CV LV EF: 60

## 2025-04-15 PROCEDURE — 93321 DOPPLER ECHO F-UP/LMTD STD: CPT | Performed by: INTERNAL MEDICINE

## 2025-04-15 PROCEDURE — 93325 DOPPLER ECHO COLOR FLOW MAPG: CPT | Performed by: INTERNAL MEDICINE

## 2025-04-15 PROCEDURE — 93312 ECHO TRANSESOPHAGEAL: CPT | Performed by: INTERNAL MEDICINE

## 2025-04-20 LAB
APOB+LDLR+PCSK9 GENE MUT ANL BLD/T: NOT DETECTED
BRCA1+BRCA2 DEL+DUP + FULL MUT ANL BLD/T: NOT DETECTED
MLH1+MSH2+MSH6+PMS2 GN DEL+DUP+FUL M: NOT DETECTED

## 2025-06-24 NOTE — ASSESSMENT & PLAN NOTE
BP in office today initially 172/84 (he reports a stressful drive to the office today)  I rechecked his pressure at the end of my visit showing 156/80  Recommended he continue to follow w/ his PCP and general cardiology teams regarding this

## 2025-06-24 NOTE — ASSESSMENT & PLAN NOTE
10/2024 - initially diagnosed, presented to PCP for routine follow-up and found to be in new onset rapid atrial fibrillation  Seen by cardiology, started on Eliquis anticoagulation + Cardizem  Discharged with plan for ongoing rate control  11/2024 - seen in office follow-up with ongoing symptoms noted, cardioversion recommended  Subsequently underwent successful cardioversion  Reported recurrent A-fib 1 week later  1/2025 - seen in EP consultation due to symptomatic persistent A-fib, ablation recommended  4/10/25 - underwent PFA afib/flutter ablation (PVI, PW, CTI) by Dr. Braga  Metoprolol stopped post-ablation  Current medication regimen:  AC: Eliquis 5mg BID ([id number] 2 (HTN, T2DM))  AAD: None  Rate: diltiazem 240mg daily

## 2025-06-24 NOTE — PROGRESS NOTES
Electrophysiology Follow Up  [facility]  [facility] [address], [place] [address]    Name: Oscar Hardin  : 1965  MRN: [id number]    Assessment & Plan  Persistent atrial fibrillation (HCC)  10/2024 - initially diagnosed, presented to PCP for routine follow-up and found to be in new onset rapid atrial fibrillation  Seen by cardiology, started on Eliquis anticoagulation + Cardizem  Discharged with plan for ongoing rate control  2024 - seen in office follow-up with ongoing symptoms noted, cardioversion recommended  Subsequently underwent successful cardioversion  Reported recurrent A-fib 1 week later  2025 - seen in EP consultation due to symptomatic persistent A-fib, ablation recommended  4/10/25 - underwent PFA afib/flutter ablation (PVI, PW, CTI) by Dr. Braga  Metoprolol stopped post-ablation  Current medication regimen:  AC: Eliquis 5mg BID ([id number] 2 (HTN, T2DM))  AAD: None  Rate: diltiazem 240mg daily  Primary hypertension  BP in office today initially 172/84 (he reports a stressful drive to the office today)  I rechecked his pressure at the end of my visit showing 156/80  Recommended he continue to follow w/ his PCP and general cardiology teams regarding this  Alcohol use  Recommend complete cessation  Tobacco use  Recommend complete cessation  Type 2 diabetes mellitus with other specified complication, without long-term current use of insulin (HCC)    Lab Results   Component Value Date    HGBA1C 7.2 (H) 10/14/2024   Continue PCP f/u       Discussion/Plan:    Patient recently underwent PFA afib/flutter ablation on 4/10/25 by Dr. Braga    Since this procedure they have been maintained on diltiazem and Eliquis as above    Since this procedure He reports he has been feeling greatly improved and currently denies acute cardiac complaint    He affirms the groin access sites have healed well    EKG in office today showing sinus rhythm w/  RBBB, LAFB, and ventricular rate 54bpm    We did discuss long-term monitoring options today such as smart watch, kardia device, and loop implantation. Patient reports he is interested in loop placement.    He also has interest in eliquis discontinuation - explained that we can consider stopping that medication after his loop is implanted     No acute medication changes were made today    Modifiable risk factors for atrial fibrillation were discussed today including weight loss, avoiding alcohol/tobacco products, and treatment of GENESIS/HTN/DM     Patient has been instructed to follow up in our EP office in 6 months or as needed. He will call our office with any questions or concerns in the meantime.    Rhythm History:   Atrial fibrillation:      Atrial flutter:      SVT:      VT/VF/PVC:     Device history:   Pacemaker:     Defibrillator:     BIV PPM:     BIV ICD:     ILR:    Interim History/HPI:   Interim history: Oscar Hardin is a 60 y.o. male with a PMH of afib/flutter s/p ablation, T2DM, HTN, and prior alcohol/tobacco abuse.    He presents today for routine outpatient follow up given a recent PFA afib/flutter ablation on 4/10/25. Since this procedure He reports he has been feeling greatly improved and currently denies acute cardiac complaint. He affirms the groin access sites have healed well.     EKG: sinus rhythm w/ RBBB, LAFB, and ventricular rate 54bpm    Review of Systems   Constitutional:  Negative for activity change, appetite change, chills, fatigue and fever.   HENT:  Negative for nosebleeds.    Respiratory:  Negative for chest tightness and shortness of breath.    Cardiovascular:  Negative for chest pain, palpitations and leg swelling.   Neurological:  Negative for dizziness, syncope, weakness and light-headedness.         OBJECTIVE:   Vitals:   There were no vitals taken for this visit.  There is no height or weight on file to calculate BMI.        Physical Exam:   Physical Exam  Constitutional:        General: He is not in acute distress.     Appearance: Normal appearance. He is not toxic-appearing.   HENT:      Head: Normocephalic and atraumatic.     Eyes:      General:         Right eye: No discharge.         Left eye: No discharge.       Cardiovascular:      Rate and Rhythm: Normal rate and regular rhythm.      Pulses: Normal pulses.   Pulmonary:      Effort: Pulmonary effort is normal.      Breath sounds: Normal breath sounds.     Musculoskeletal:      Right lower leg: No edema.      Left lower leg: No edema.     Skin:     General: Skin is warm and dry.      Capillary Refill: Capillary refill takes less than 2 seconds.     Neurological:      Mental Status: He is alert.            Medications:    Current Medications[1]       Historical Information   Past Medical History[2]    Past Surgical History[3]    Social History     Substance and Sexual Activity   Alcohol Use Yes    Alcohol/week: 3.0 standard drinks of alcohol    Types: 3 Standard drinks or equivalent per week    Comment: daily     Social History     Substance and Sexual Activity   Drug Use Never     Tobacco Use History[4]    Family History[5]      Labs & Results:  Below is the patient's most recent value for Albumin, ALT, AST, BUN, Calcium, Chloride, Cholesterol, CO2, Creatinine, GFR, Glucose, HDL, Hematocrit, Hemoglobin, Hemoglobin A1C, LDL, Magnesium, Phosphorus, Platelets, Potassium, PSA, Sodium, Triglycerides, and WBC.   Lab Results   Component Value Date    ALT 29 04/07/2025    AST 13 04/07/2025    BUN 13 04/10/2025    CALCIUM 8.4 04/10/2025     04/10/2025    CO2 26 04/10/2025    CREATININE 0.90 04/10/2025    HCT 42.5 04/10/2025    HGB 15.2 04/10/2025    HGBA1C 7.2 (H) 10/14/2024    MG 2.2 10/15/2024    PHOS 2.8 10/15/2024     04/10/2025    K 3.6 04/10/2025    WBC 8.05 04/10/2025     Note: for a comprehensive list of the patient's lab results, access the Results Review activity.         [1]   Current Outpatient Medications:      apixaban (ELIQUIS) 5 mg, Take 1 tablet (5 mg total) by mouth 2 (two) times a day, Disp: 60 tablet, Rfl: 0    ASHWAGANDHA PO, Take by mouth, Disp: , Rfl:     BIOTIN PO, Take by mouth, Disp: , Rfl:     cyclobenzaprine (FLEXERIL) 10 mg tablet, Take 10 mg by mouth as needed, Disp: , Rfl:     diltiazem (Dilt-XR) 240 MG 24 hr capsule, take 1 capsule by mouth once daily, Disp: 100 capsule, Rfl: 1    irbesartan-hydrochlorothiazide (AVALIDE) 300-12.5 MG per tablet, Take 1 tablet by mouth daily, Disp: , Rfl:     MILK THISTLE PO, Take by mouth, Disp: , Rfl:     MULTIPLE VITAMIN PO, Take by mouth, Disp: , Rfl:     Niacin (VITAMIN B-3 PO), Take by mouth, Disp: , Rfl:     RESVERATROL PO, Take by mouth, Disp: , Rfl:     tadalafil (CIALIS) 5 MG tablet, Take 5 mg by mouth in the morning, Disp: , Rfl:     tamsulosin (FLOMAX) 0.4 mg, Take 0.4 mg by mouth daily with dinner, Disp: , Rfl:     TURMERIC PO, Take by mouth, Disp: , Rfl:   [2]   Past Medical History:  Diagnosis Date    Atrial fibrillation (HCC)     s/p cardioversion 11/20/2024    DMII (diabetes mellitus, type 2) (HCC) 02/06/2021    Hypercholesteremia 02/06/2021    Hypertension 02/06/2021    IBS (irritable bowel syndrome)     Constipation predominant    Lyme disease     Migraine 09/13/2017    Osteoarthritis     Persistent atrial fibrillation (HCC)     Post concussive encephalopathy 09/29/2017    RSD (reflex sympathetic dystrophy) 02/06/2021   [3]   Past Surgical History:  Procedure Laterality Date    CARDIAC ELECTROPHYSIOLOGY PROCEDURE N/A 4/10/2025    Procedure: Cardiac eps/afib ablation PFA;  Surgeon: Mani Braga MD;  Location: [facility];  Service: Cardiology    CARDIOVERSION  11/20/2024    successful atrial fibrillation cardioversion    CHOLECYSTECTOMY     [4]   Social History  Tobacco Use   Smoking Status Every Day    Current packs/day: 1.00    Average packs/day: 1 pack/day for 35.0 years (35.0 ttl pk-yrs)    Types: Cigarettes   Smokeless Tobacco Never   [5]    Family History  Problem Relation Name Age of Onset    Skin cancer Mother      Cervical cancer Daughter

## 2025-07-02 ENCOUNTER — OFFICE VISIT (OUTPATIENT)
Dept: CARDIOLOGY CLINIC | Facility: CLINIC | Age: 60
End: 2025-07-02
Payer: COMMERCIAL

## 2025-07-02 VITALS
HEIGHT: 70 IN | DIASTOLIC BLOOD PRESSURE: 84 MMHG | BODY MASS INDEX: 29.35 KG/M2 | HEART RATE: 54 BPM | SYSTOLIC BLOOD PRESSURE: 172 MMHG | WEIGHT: 205 LBS

## 2025-07-02 DIAGNOSIS — E11.69 TYPE 2 DIABETES MELLITUS WITH OTHER SPECIFIED COMPLICATION, WITHOUT LONG-TERM CURRENT USE OF INSULIN (HCC): ICD-10-CM

## 2025-07-02 DIAGNOSIS — F10.90 ALCOHOL USE: ICD-10-CM

## 2025-07-02 DIAGNOSIS — I10 PRIMARY HYPERTENSION: ICD-10-CM

## 2025-07-02 DIAGNOSIS — Z72.0 TOBACCO USE: ICD-10-CM

## 2025-07-02 DIAGNOSIS — I48.19 PERSISTENT ATRIAL FIBRILLATION (HCC): Primary | ICD-10-CM

## 2025-07-02 LAB
ATRIAL RATE: 54 BPM
P AXIS: 191 DEGREES
PR INTERVAL: 168 MS
QRS AXIS: -78 DEGREES
QRSD INTERVAL: 154 MS
QT INTERVAL: 478 MS
QTC INTERVAL: 453 MS
T WAVE AXIS: 8 DEGREES
VENTRICULAR RATE: 54 BPM

## 2025-07-02 PROCEDURE — 93000 ELECTROCARDIOGRAM COMPLETE: CPT

## 2025-07-02 PROCEDURE — 99214 OFFICE O/P EST MOD 30 MIN: CPT

## 2025-07-02 NOTE — Clinical Note
Hello,  This patient is feeling greatly improved post-ablation and sends his regards.  He's on eliquis ([id number] 2 (T2DM, HTN)) and diltiazem and had interest in stopping the eliquis  He had interest in loop recorder and hopes that if that's clean then he'd be able to stop the AC (eliquis is expensive and reports easy bleeding - had to give some samples today)  Can we please schedule him for Medtronic loop implantation at soonest available appointment?  Thanks!

## 2025-07-03 ENCOUNTER — TELEPHONE (OUTPATIENT)
Dept: CARDIOLOGY CLINIC | Facility: CLINIC | Age: 60
End: 2025-07-03

## 2025-07-03 DIAGNOSIS — I48.19 PERSISTENT ATRIAL FIBRILLATION (HCC): Primary | ICD-10-CM

## 2025-07-03 NOTE — TELEPHONE ENCOUNTER
Spoke to patient explained in detail the reason for the monitors and what to do when they arrive and when he is done with them. He had no further questions and was agreeable to the plan.

## 2025-07-03 NOTE — TELEPHONE ENCOUNTER
"Florencio Braga / Antoine / ALYSSA Clinical    I'm following up on patient's referral for a LOOP Recorder  Implant procedure.     Per patient's insurance of [facility], they require patient to do 4W ZIO prior to obtaining auth for LOOP Recorder.     Please order 4W ZIO and inform patient.     Once results are in Epic, I can schedule the procedure.    Thank you,   Kimberly \"Kya\" Roland   "

## 2025-07-03 NOTE — TELEPHONE ENCOUNTER
----- Message -----  From: Antoine Navarro PA-C  Sent: 7/2/2025   3:29 PM EDT  To: Mani Braga MD; Cardiology Surgery Coord*    Hello,    This patient is feeling greatly improved post-ablation and sends his regards.    He's on eliquis ([id number] 2 (T2DM, HTN)) and diltiazem and had interest in stopping the eliquis    He had interest in loop recorder and hopes that if that's clean then he'd be able to stop the AC (eliquis is expensive and reports easy bleeding - had to give some samples today)    Can we please schedule him for Medtronic loop implantation at soonest available appointment?    Thanks!

## 2025-07-09 ENCOUNTER — TELEPHONE (OUTPATIENT)
Dept: CARDIOLOGY CLINIC | Facility: CLINIC | Age: 60
End: 2025-07-09

## 2025-07-09 NOTE — TELEPHONE ENCOUNTER
"Received an email from [employer] stating monitor was returned unused.     Called and spoke to patient. Patient states he read through the booklet and feels \" its useless\" to wear because he has no heart issues going on so it will not show anything. Patient states he uses a hot tub everyday for back issues and its the middle of summer and booklet states no heat or water, so therefore does not want to wear monitor.     Patient feels the monitor will not show anything and insurance will see nothing on monitor and denies loop implant anyway's.     Tried to talk to patient and explain the importance of wearing the monitor for insurance to approve surgery. He did not want to listen and wants a provider to call and talk about the need for Zio monitor.   "

## 2025-07-09 NOTE — TELEPHONE ENCOUNTER
Spoke with patient and explained if he wants to get off blood thinners and wants the loop he has to wear the ZIO. Patient understands and wants to get off thinners eventually. He is just worried about the insurance approval for loop implant if zio shows nothing. Explained its a case to case bases on what insurance decides to do. I offered him to readdress this in the fall when it is not as hot out and gives him time to think about this. Patient was agreeable to proceed in the fall with the zio monitors and loop implant. I told him I would reach out in September to make sure he is still agreeable to zio and loop, he agrees with plan.

## 2025-08-19 ENCOUNTER — OFFICE VISIT (OUTPATIENT)
Dept: SLEEP CENTER | Facility: CLINIC | Age: 60
End: 2025-08-19
Attending: INTERNAL MEDICINE
Payer: COMMERCIAL

## 2025-08-19 VITALS
WEIGHT: 200 LBS | HEIGHT: 70 IN | SYSTOLIC BLOOD PRESSURE: 159 MMHG | OXYGEN SATURATION: 98 % | BODY MASS INDEX: 28.63 KG/M2 | HEART RATE: 53 BPM | DIASTOLIC BLOOD PRESSURE: 79 MMHG

## 2025-08-19 DIAGNOSIS — E11.69 TYPE 2 DIABETES MELLITUS WITH OTHER SPECIFIED COMPLICATION, WITHOUT LONG-TERM CURRENT USE OF INSULIN (HCC): ICD-10-CM

## 2025-08-19 DIAGNOSIS — I48.19 PERSISTENT ATRIAL FIBRILLATION (HCC): Primary | ICD-10-CM

## 2025-08-19 DIAGNOSIS — I10 PRIMARY HYPERTENSION: ICD-10-CM

## 2025-08-19 DIAGNOSIS — G47.21 SLEEP DISORDER, CIRCADIAN, DELAYED SLEEP PHASE TYPE: ICD-10-CM

## 2025-08-19 DIAGNOSIS — G47.19 EXCESSIVE DAYTIME SLEEPINESS: ICD-10-CM

## 2025-08-19 PROCEDURE — 99204 OFFICE O/P NEW MOD 45 MIN: CPT | Performed by: PSYCHIATRY & NEUROLOGY

## 2025-08-23 ENCOUNTER — OFFICE VISIT (OUTPATIENT)
Dept: URGENT CARE | Age: 60
End: 2025-08-23
Payer: COMMERCIAL

## 2025-08-23 VITALS
OXYGEN SATURATION: 99 % | RESPIRATION RATE: 18 BRPM | TEMPERATURE: 97.8 F | SYSTOLIC BLOOD PRESSURE: 160 MMHG | HEART RATE: 54 BPM | DIASTOLIC BLOOD PRESSURE: 88 MMHG

## 2025-08-23 DIAGNOSIS — M54.2 NECK PAIN ON RIGHT SIDE: Primary | ICD-10-CM

## 2025-08-23 PROCEDURE — 99213 OFFICE O/P EST LOW 20 MIN: CPT | Performed by: STUDENT IN AN ORGANIZED HEALTH CARE EDUCATION/TRAINING PROGRAM

## 2025-08-23 PROCEDURE — S9083 URGENT CARE CENTER GLOBAL: HCPCS | Performed by: STUDENT IN AN ORGANIZED HEALTH CARE EDUCATION/TRAINING PROGRAM

## 2025-08-23 RX ORDER — LIDOCAINE 50 MG/G
1 PATCH TOPICAL DAILY
Qty: 30 PATCH | Refills: 0 | Status: SHIPPED | OUTPATIENT
Start: 2025-08-23

## 2025-08-27 PROBLEM — G47.33 OSA (OBSTRUCTIVE SLEEP APNEA): Status: ACTIVE | Noted: 2025-08-27

## (undated) DEVICE — REF PATCH ENSITE X

## (undated) DEVICE — CATH EP ADVISOR HD GRID MAPPING 8F

## (undated) DEVICE — ROSEN CURVED WIRE GUIDE: Brand: ROSEN

## (undated) DEVICE — PINNACLE INTRODUCER SHEATH: Brand: PINNACLE

## (undated) DEVICE — CATH ABLATION FARAWAVE PULSED FIELD 31MM

## (undated) DEVICE — 1 X VERSACROSS CONNECT TRANSSEPTAL DILATOR;  1 X VERSACROSS RF WIRE (INCLUDING 1 X CONNECTOR CABLE (SINGLE USE)): Brand: VERSACROSS CONNECT ACCESS SOLUTION FOR FARADRIVE

## (undated) DEVICE — CATH EP 7FR DI-DIR 10-POLE DEFL CS 2-8-2 FJ

## (undated) DEVICE — SHEATH STEERABLE FARADRIVE

## (undated) DEVICE — CABLE CATH CONNECTION FARASTAR

## (undated) DEVICE — CATH ULTRASOUND ACUNAV ICE 8FR 90CM GE VIVID-I